# Patient Record
Sex: MALE | Race: WHITE | NOT HISPANIC OR LATINO | Employment: FULL TIME | URBAN - NONMETROPOLITAN AREA
[De-identification: names, ages, dates, MRNs, and addresses within clinical notes are randomized per-mention and may not be internally consistent; named-entity substitution may affect disease eponyms.]

---

## 2021-02-01 ENCOUNTER — OFFICE VISIT (OUTPATIENT)
Dept: URGENT CARE | Facility: PHYSICIAN GROUP | Age: 41
End: 2021-02-01
Payer: COMMERCIAL

## 2021-02-01 VITALS
BODY MASS INDEX: 29.47 KG/M2 | OXYGEN SATURATION: 94 % | RESPIRATION RATE: 16 BRPM | SYSTOLIC BLOOD PRESSURE: 114 MMHG | WEIGHT: 199 LBS | DIASTOLIC BLOOD PRESSURE: 72 MMHG | HEIGHT: 69 IN | HEART RATE: 75 BPM | TEMPERATURE: 97.1 F

## 2021-02-01 DIAGNOSIS — S39.012A ACUTE MYOFASCIAL STRAIN OF LUMBAR REGION, INITIAL ENCOUNTER: ICD-10-CM

## 2021-02-01 PROCEDURE — 99204 OFFICE O/P NEW MOD 45 MIN: CPT | Performed by: PHYSICIAN ASSISTANT

## 2021-02-01 RX ORDER — CYCLOBENZAPRINE HCL 10 MG
10 TABLET ORAL 3 TIMES DAILY PRN
Qty: 30 TAB | Refills: 0 | Status: SHIPPED | OUTPATIENT
Start: 2021-02-01 | End: 2021-09-15

## 2021-02-01 RX ORDER — PREDNISONE 10 MG/1
TABLET ORAL
Qty: 1 QUANTITY SUFFICIENT | Refills: 0 | Status: SHIPPED | OUTPATIENT
Start: 2021-02-01 | End: 2021-09-15

## 2021-02-01 NOTE — PROGRESS NOTES
"Chief Complaint   Patient presents with   • Back Pain     x1day, lower right achey px       HISTORY OF PRESENT ILLNESS: Patient is a 37 y.o. male who presents today for the following:    Right side LBP after picking up a case of water yesterday  Constant, achy  Ibuprofen didn't help  Denies distal paresthesias, radiating pain, saddle anesthesia, bowel/bladder incontinence    There are no active problems to display for this patient.      Allergies:Patient has no known allergies.    Current Outpatient Medications Ordered in Epic   Medication Sig Dispense Refill   • Buprenorphine HCl-Naloxone HCl (SUBOXONE SL) Place  under the tongue.     • predniSONE (DELTASONE) 10 MG Tab 50 mg x 1 day; 40 mg x 1 day; 30 mg x 1 day; 20 mg x 1 day; 10 mg x 1 day 1 Quantity Sufficient 0   • cyclobenzaprine (FLEXERIL) 10 mg Tab Take 1 Tab by mouth 3 times a day as needed for Mild Pain or Moderate Pain. 30 Tab 0     No current Epic-ordered facility-administered medications on file.        History reviewed. No pertinent past medical history.    Social History     Tobacco Use   • Smoking status: Not on file   Substance Use Topics   • Alcohol use: Not on file   • Drug use: Not on file       No family status information on file.   History reviewed. No pertinent family history.    Review of Systems:    Constitutional ROS: No unexpected change in weight, No weakness, No fatigue  Pulmonary ROS: No chronic cough, sputum, or hemoptysis, No dyspnea on exertion, No wheezing  Cardiovascular ROS: No diaphoresis, No edema, No palpitations  Musculoskeletal/Extremities ROS: Right-sided low back pain  Hematologic/Lymphatic ROS: No chills, No night sweats, No weight loss  Skin/Integumentary ROS: No edema, No evidence of rash, No itching      Exam:  /72   Pulse 75   Temp 36.2 °C (97.1 °F) (Temporal)   Resp 16   Ht 1.753 m (5' 9\")   Wt 90.3 kg (199 lb)   SpO2 94%   General: Well developed, well nourished. No distress.    HENT: Head is grossly " normal.  Pulmonary: Unlabored respiratory effort.   Neurologic: Grossly nonfocal. No facial asymmetry noted.  Musculoskeletal: Mild tenderness noted in the paraspinous muscles of the lumbar spine on the right side only.  No vertebral tenderness noted.  Decreased range of motion in all planes due to pain.  No motor or sensory deficit noted in the extremities.  Strong plantar flexion/dorsiflexion bilaterally.  Skin: Warm, dry, good turgor. No rashes in visible areas.   Psych: Normal mood. Alert and oriented to person, place and time.    Assessment/Plan:  Use all medication as directed.  Discussed appropriate over-the-counter symptomatic medication, and when to return to clinic.  Discussed red flags and ER precautions.  Follow up for worsening or persistent symptoms.  1. Acute myofascial strain of lumbar region, initial encounter  predniSONE (DELTASONE) 10 MG Tab    cyclobenzaprine (FLEXERIL) 10 mg Tab

## 2021-09-01 DIAGNOSIS — Z11.52 ENCOUNTER FOR SCREENING FOR COVID-19: ICD-10-CM

## 2021-09-08 ENCOUNTER — HOSPITAL ENCOUNTER (OUTPATIENT)
Facility: MEDICAL CENTER | Age: 41
End: 2021-09-08
Attending: PHYSICIAN ASSISTANT
Payer: COMMERCIAL

## 2021-09-08 DIAGNOSIS — Z11.52 ENCOUNTER FOR SCREENING FOR COVID-19: ICD-10-CM

## 2021-09-08 PROCEDURE — U0003 INFECTIOUS AGENT DETECTION BY NUCLEIC ACID (DNA OR RNA); SEVERE ACUTE RESPIRATORY SYNDROME CORONAVIRUS 2 (SARS-COV-2) (CORONAVIRUS DISEASE [COVID-19]), AMPLIFIED PROBE TECHNIQUE, MAKING USE OF HIGH THROUGHPUT TECHNOLOGIES AS DESCRIBED BY CMS-2020-01-R: HCPCS

## 2021-09-08 PROCEDURE — U0005 INFEC AGEN DETEC AMPLI PROBE: HCPCS

## 2021-09-09 LAB
COVID ORDER STATUS COVID19: NORMAL
SARS-COV-2 RNA RESP QL NAA+PROBE: DETECTED
SPECIMEN SOURCE: ABNORMAL

## 2021-09-15 ENCOUNTER — OFFICE VISIT (OUTPATIENT)
Dept: URGENT CARE | Facility: PHYSICIAN GROUP | Age: 41
End: 2021-09-15
Payer: COMMERCIAL

## 2021-09-15 VITALS
SYSTOLIC BLOOD PRESSURE: 116 MMHG | TEMPERATURE: 98.1 F | WEIGHT: 189 LBS | BODY MASS INDEX: 27.99 KG/M2 | OXYGEN SATURATION: 94 % | RESPIRATION RATE: 16 BRPM | HEIGHT: 69 IN | DIASTOLIC BLOOD PRESSURE: 64 MMHG | HEART RATE: 109 BPM

## 2021-09-15 DIAGNOSIS — U07.1 COVID-19 VIRUS INFECTION: ICD-10-CM

## 2021-09-15 DIAGNOSIS — R06.02 SOB (SHORTNESS OF BREATH): ICD-10-CM

## 2021-09-15 PROCEDURE — 99214 OFFICE O/P EST MOD 30 MIN: CPT | Mod: CS | Performed by: PHYSICIAN ASSISTANT

## 2021-09-15 RX ORDER — DEXAMETHASONE 6 MG/1
6 TABLET ORAL DAILY
Qty: 7 TABLET | Refills: 0 | Status: SHIPPED | OUTPATIENT
Start: 2021-09-15 | End: 2021-09-22

## 2021-09-15 RX ORDER — BUPRENORPHINE AND NALOXONE 8; 2 MG/1; MG/1
FILM, SOLUBLE BUCCAL; SUBLINGUAL
COMMUNITY
Start: 2021-09-05 | End: 2021-09-15

## 2021-09-15 NOTE — PROGRESS NOTES
"Chief Complaint   Patient presents with   • Coronavirus Screening     Positive: SOB 2-3days        HISTORY OF PRESENT ILLNESS: Patient is a 41 y.o. male who presents today for the following:    COVID positive; symptoms for around a week  SOB over the last 2-3 days  Taste is still off  Fatigued    There are no problems to display for this patient.      Allergies:Patient has no known allergies.    Current Outpatient Medications Ordered in Epic   Medication Sig Dispense Refill   • dexamethasone (DECADRON) 6 MG Tab Take 1 Tablet by mouth every day for 7 days. 7 Tablet 0   • Buprenorphine HCl-Naloxone HCl (SUBOXONE SL) Place  under the tongue.       No current Epic-ordered facility-administered medications on file.       History reviewed. No pertinent past medical history.    Social History     Tobacco Use   • Smoking status: Not on file   Substance Use Topics   • Alcohol use: Not on file   • Drug use: Not on file       No family status information on file.   History reviewed. No pertinent family history.    Review of Systems:   Pertinent systems reviewed with the patient.      Exam:  /64   Pulse (!) 109   Temp 36.7 °C (98.1 °F) (Temporal)   Resp 16   Ht 1.753 m (5' 9\")   Wt 85.7 kg (189 lb)   SpO2 94%   General: Well developed, well nourished. No distress.    HENT: Head is grossly normal.  Pulmonary: Unlabored respiratory effort. Lungs clear to auscultation, no wheezes, no rhonchi.    Cardiovascular: Regular rate and rhythm without murmur.   Neurologic: Grossly nonfocal. No facial asymmetry noted.  Lymph: No cervical lymphadenopathy noted.  Skin: Warm, dry, good turgor. No rashes in visible areas.   Psych: Normal mood. Alert and oriented to person, place and time.    Assessment/Plan:  93-94% on room air.  Starting steroids.  Follow up for worsening or persistent symptoms.  1. COVID-19 virus infection     2. SOB (shortness of breath)  dexamethasone (DECADRON) 6 MG Tab       "

## 2021-09-17 ENCOUNTER — TELEPHONE (OUTPATIENT)
Dept: URGENT CARE | Facility: PHYSICIAN GROUP | Age: 41
End: 2021-09-17

## 2022-02-11 ENCOUNTER — HOSPITAL ENCOUNTER (OUTPATIENT)
Dept: LAB | Facility: MEDICAL CENTER | Age: 42
End: 2022-02-11
Attending: FAMILY MEDICINE
Payer: COMMERCIAL

## 2022-02-11 ENCOUNTER — OFFICE VISIT (OUTPATIENT)
Dept: MEDICAL GROUP | Facility: PHYSICIAN GROUP | Age: 42
End: 2022-02-11
Payer: COMMERCIAL

## 2022-02-11 VITALS
OXYGEN SATURATION: 98 % | TEMPERATURE: 97.2 F | RESPIRATION RATE: 14 BRPM | WEIGHT: 207 LBS | SYSTOLIC BLOOD PRESSURE: 120 MMHG | HEIGHT: 69 IN | HEART RATE: 84 BPM | BODY MASS INDEX: 30.66 KG/M2 | DIASTOLIC BLOOD PRESSURE: 82 MMHG

## 2022-02-11 DIAGNOSIS — E66.9 OBESITY (BMI 30-39.9): ICD-10-CM

## 2022-02-11 DIAGNOSIS — R07.89 OTHER CHEST PAIN: ICD-10-CM

## 2022-02-11 DIAGNOSIS — L40.3 PUSTULAR PSORIASIS OF PALM OF HAND: ICD-10-CM

## 2022-02-11 DIAGNOSIS — F41.9 ANXIETY: ICD-10-CM

## 2022-02-11 DIAGNOSIS — R07.9 CHEST PAIN, UNSPECIFIED TYPE: ICD-10-CM

## 2022-02-11 DIAGNOSIS — L65.9 HAIR LOSS: ICD-10-CM

## 2022-02-11 LAB
ALBUMIN SERPL BCP-MCNC: 4.6 G/DL (ref 3.2–4.9)
ALBUMIN/GLOB SERPL: 1.7 G/DL
ALP SERPL-CCNC: 88 U/L (ref 30–99)
ALT SERPL-CCNC: 67 U/L (ref 2–50)
ANION GAP SERPL CALC-SCNC: 11 MMOL/L (ref 7–16)
AST SERPL-CCNC: 40 U/L (ref 12–45)
BILIRUB SERPL-MCNC: 0.7 MG/DL (ref 0.1–1.5)
BUN SERPL-MCNC: 18 MG/DL (ref 8–22)
CALCIUM SERPL-MCNC: 9.5 MG/DL (ref 8.5–10.5)
CHLORIDE SERPL-SCNC: 104 MMOL/L (ref 96–112)
CHOLEST SERPL-MCNC: 172 MG/DL (ref 100–199)
CO2 SERPL-SCNC: 24 MMOL/L (ref 20–33)
CREAT SERPL-MCNC: 0.79 MG/DL (ref 0.5–1.4)
FASTING STATUS PATIENT QL REPORTED: NORMAL
GLOBULIN SER CALC-MCNC: 2.7 G/DL (ref 1.9–3.5)
GLUCOSE SERPL-MCNC: 93 MG/DL (ref 65–99)
HDLC SERPL-MCNC: 42 MG/DL
LDLC SERPL CALC-MCNC: 115 MG/DL
POTASSIUM SERPL-SCNC: 4.2 MMOL/L (ref 3.6–5.5)
PROT SERPL-MCNC: 7.3 G/DL (ref 6–8.2)
SODIUM SERPL-SCNC: 139 MMOL/L (ref 135–145)
TRIGL SERPL-MCNC: 77 MG/DL (ref 0–149)
TSH SERPL DL<=0.005 MIU/L-ACNC: 1.46 UIU/ML (ref 0.38–5.33)

## 2022-02-11 PROCEDURE — 85025 COMPLETE CBC W/AUTO DIFF WBC: CPT

## 2022-02-11 PROCEDURE — 80061 LIPID PANEL: CPT

## 2022-02-11 PROCEDURE — 84443 ASSAY THYROID STIM HORMONE: CPT

## 2022-02-11 PROCEDURE — 99214 OFFICE O/P EST MOD 30 MIN: CPT | Performed by: FAMILY MEDICINE

## 2022-02-11 PROCEDURE — 36415 COLL VENOUS BLD VENIPUNCTURE: CPT

## 2022-02-11 PROCEDURE — 80053 COMPREHEN METABOLIC PANEL: CPT

## 2022-02-11 RX ORDER — BUPRENORPHINE AND NALOXONE 8; 2 MG/1; MG/1
FILM, SOLUBLE BUCCAL; SUBLINGUAL
COMMUNITY
Start: 2022-01-31 | End: 2022-12-15 | Stop reason: SDUPTHER

## 2022-02-11 RX ORDER — CITALOPRAM 20 MG/1
20 TABLET ORAL DAILY
Qty: 30 TABLET | Refills: 3 | Status: SHIPPED | OUTPATIENT
Start: 2022-02-11 | End: 2022-06-28 | Stop reason: SDUPTHER

## 2022-02-11 RX ORDER — TRIAMCINOLONE ACETONIDE 1 MG/G
CREAM TOPICAL
Qty: 453.6 G | Refills: 1 | Status: SHIPPED | OUTPATIENT
Start: 2022-02-11 | End: 2022-09-01

## 2022-02-11 RX ORDER — HYDROXYZINE HYDROCHLORIDE 25 MG/1
25 TABLET, FILM COATED ORAL 3 TIMES DAILY PRN
Qty: 30 TABLET | Refills: 3 | Status: SHIPPED | OUTPATIENT
Start: 2022-02-11 | End: 2022-10-21 | Stop reason: SDUPTHER

## 2022-02-11 ASSESSMENT — PATIENT HEALTH QUESTIONNAIRE - PHQ9
SUM OF ALL RESPONSES TO PHQ QUESTIONS 1-9: 5
5. POOR APPETITE OR OVEREATING: 0 - NOT AT ALL
CLINICAL INTERPRETATION OF PHQ2 SCORE: 1

## 2022-02-11 NOTE — ASSESSMENT & PLAN NOTE
Has dry skin, fissures, pustules, worse today due to golf recently.   rx for triamcinolone cream provided.

## 2022-02-11 NOTE — ASSESSMENT & PLAN NOTE
He has noticed an increase in hair loss especially in the shower. Appears like clumps of hair are falling out.

## 2022-02-11 NOTE — ASSESSMENT & PLAN NOTE
Patient is 41 years old.  Over the past 3 to 4 months he has noticed episodes that are intermittent of racing heart and chest pain.  These episodes happen at rest.  The first episode happened after work when he was home and laying in bed.  Episodes feel like heart racing chest pressure sometimes radiating to jaw and arm but he is not entirely sure about this radiation.  The episodes last from 5 to 30 minutes to 1 hour episodes happen every 2 weeks.  Episodes resolve when he gets up and walks around calms down or falls asleep.  Some episodes have woken him from sleep or happen when he wakes up from a nap.   Last episode was 2 weeks ago.     Sometimes he will be laying in bed or sitting and will feel as though his body and head are shaking, he just has the feeling, his body is not actually shaking.     He does not drink coffee or tea on a daily basis.  He has 1 red bull a day only on workdays.  He quit smoking 5 months ago he has a 20-pack-year of smoking.  He has no current drug use he has a remote history of short-term use of meth and cocaine this is 20 years ago.  He does not drink alcohol on a regular basis except at social occasions.    He does not have a strong family history of heart disease.  No one in his family has had a heart attack, stroke, hypertension or dyslipidemia.  There are no relatives in his family with early death from heart attack.

## 2022-02-11 NOTE — ASSESSMENT & PLAN NOTE
He notes he has always been a high strung person.   He has never been diagnosed with anxiety or panic attacks in the past or in childhood.   In the past 3-4 months he sudden episodes that sometimes wake him from sleep of heart racing and chest pain. He sometimes feel impending doom or like he will die during the attacks.   His mother takes celexa for depression and it works well for her.   He tried prozac in the past which did not help.   Will provide Rx for celexa daily and hydroxyzine for as needed

## 2022-02-11 NOTE — PROGRESS NOTES
Subjective:   Harry Dahl is a 41 y.o. male here today for evaluation and management of:     Hair loss            Anxiety  He notes he has always been a high strung person.   He has never been diagnosed with anxiety or panic attacks in the past or in childhood.   In the past 3-4 months he sudden episodes that sometimes wake him from sleep of heart racing and chest pain. He sometimes feel impending doom or like he will die during the attacks.   His mother takes celexa for depression and it works well for her.   He tried prozac in the past which did not help.   Will provide Rx for celexa daily and hydroxyzine for as needed    Other chest pain  Patient is 41 years old.  Over the past 3 to 4 months he has noticed episodes that are intermittent of racing heart and chest pain.  These episodes happen at rest.  The first episode happened after work when he was home and laying in bed.  Episodes feel like heart racing chest pressure sometimes radiating to jaw and arm but he is not entirely sure about this radiation.  The episodes last from 5 to 30 minutes to 1 hour episodes happen every 2 weeks.  Episodes resolve when he gets up and walks around calms down or falls asleep.  Some episodes have woken him from sleep or happen when he wakes up from a nap.   Last episode was 2 weeks ago.     Sometimes he will be laying in bed or sitting and will feel as though his body and head are shaking, he just has the feeling, his body is not actually shaking.     He does not drink coffee or tea on a daily basis.  He has 1 red bull a day only on workdays.  He quit smoking 5 months ago he has a 20-pack-year of smoking.  He has no current drug use he has a remote history of short-term use of meth and cocaine this is 20 years ago.  He does not drink alcohol on a regular basis except at social occasions.    He does not have a strong family history of heart disease.  No one in his family has had a heart attack, stroke, hypertension or  "dyslipidemia.  There are no relatives in his family with early death from heart attack.                Pustular psoriasis of palm of hand  Has dry skin, fissures, pustules, worse today due to golf recently.   rx for triamcinolone cream provided.          Current medicines (including changes today)  Current Outpatient Medications   Medication Sig Dispense Refill   • Buprenorphine HCl-Naloxone HCl 8-2 MG FILM DISSOLVE 1 FILM UNDER THE TONGUE TWICE DAILY     • citalopram (CELEXA) 20 MG Tab Take 1 Tablet by mouth every day. For anxiety 30 Tablet 3   • hydrOXYzine HCl (ATARAX) 25 MG Tab Take 1 Tablet by mouth 3 times a day as needed for Anxiety. 30 Tablet 3   • triamcinolone acetonide (KENALOG) 0.1 % Cream Use in a thin film to affected skin twice a day. Stop for a week if using longer than 3 weeks. 453.6 g 1     No current facility-administered medications for this visit.     He  has no past medical history on file.    ROS  No chest pain, no shortness of breath, no abdominal pain       Objective:     /82   Pulse 84   Temp 36.2 °C (97.2 °F) (Temporal)   Resp 14   Ht 1.753 m (5' 9\")   Wt 93.9 kg (207 lb)   SpO2 98%  Body mass index is 30.57 kg/m².   Physical Exam:  Constitutional: Alert, no distress.  Skin: Warm, dry, good turgor, no rashes in visible areas.  Eye: Equal, round and reactive, conjunctiva clear, lids normal.  ENMT: Lips without lesions, good dentition, oropharynx clear.  Neck: Trachea midline, no masses, no thyromegaly. No cervical or supraclavicular lymphadenopathy  Respiratory: Unlabored respiratory effort, lungs clear to auscultation, no wheezes, no ronchi.  Cardiovascular: Normal S1, S2, no murmur, no edema.  Abdomen: Soft, non-tender, no masses, no hepatosplenomegaly.  Psych: Alert and oriented x3, normal affect and mood.        Assessment and Plan:   The following treatment plan was discussed    1. Chest pain, unspecified type    - EKG - Clinic Performed  - CBC WITH DIFFERENTIAL; Future  - " Comp Metabolic Panel; Future  - Lipid Profile; Future  - TSH WITH REFLEX TO FT4; Future    2. Hair loss      3. Other chest pain      4. Anxiety    5. Obesity (BMI 30-39.9)    - Patient identified as having weight management issue.  Appropriate orders and counseling given.      Followup: Return in about 3 months (around 5/11/2022) for chest pain, anxiety. .

## 2022-02-12 LAB
BASOPHILS # BLD AUTO: 0.5 % (ref 0–1.8)
BASOPHILS # BLD: 0.03 K/UL (ref 0–0.12)
EOSINOPHIL # BLD AUTO: 0.1 K/UL (ref 0–0.51)
EOSINOPHIL NFR BLD: 1.6 % (ref 0–6.9)
ERYTHROCYTE [DISTWIDTH] IN BLOOD BY AUTOMATED COUNT: 40.5 FL (ref 35.9–50)
HCT VFR BLD AUTO: 43.7 % (ref 42–52)
HGB BLD-MCNC: 15.3 G/DL (ref 14–18)
IMM GRANULOCYTES # BLD AUTO: 0.02 K/UL (ref 0–0.11)
IMM GRANULOCYTES NFR BLD AUTO: 0.3 % (ref 0–0.9)
LYMPHOCYTES # BLD AUTO: 1.92 K/UL (ref 1–4.8)
LYMPHOCYTES NFR BLD: 31.1 % (ref 22–41)
MCH RBC QN AUTO: 29.5 PG (ref 27–33)
MCHC RBC AUTO-ENTMCNC: 35 G/DL (ref 33.7–35.3)
MCV RBC AUTO: 84.4 FL (ref 81.4–97.8)
MONOCYTES # BLD AUTO: 0.76 K/UL (ref 0–0.85)
MONOCYTES NFR BLD AUTO: 12.3 % (ref 0–13.4)
NEUTROPHILS # BLD AUTO: 3.34 K/UL (ref 1.82–7.42)
NEUTROPHILS NFR BLD: 54.2 % (ref 44–72)
NRBC # BLD AUTO: 0 K/UL
NRBC BLD-RTO: 0 /100 WBC
PLATELET # BLD AUTO: 212 K/UL (ref 164–446)
PMV BLD AUTO: 11.3 FL (ref 9–12.9)
RBC # BLD AUTO: 5.18 M/UL (ref 4.7–6.1)
WBC # BLD AUTO: 6.2 K/UL (ref 4.8–10.8)

## 2022-02-23 NOTE — RESULT ENCOUNTER NOTE
Released to daphne Mcdonald,  Your labs show slightly elevated cholesterol and liver enzymes.  You can improve this with decreasing fried fatty foods and avoid alcohol intake.  I will follow up with you in May to discuss these in detail.  Danny Ryan M.D.

## 2022-06-28 RX ORDER — CITALOPRAM 20 MG/1
20 TABLET ORAL DAILY
Qty: 90 TABLET | Refills: 3 | Status: SHIPPED | OUTPATIENT
Start: 2022-06-28 | End: 2022-09-01 | Stop reason: SDUPTHER

## 2022-06-28 NOTE — TELEPHONE ENCOUNTER
Received request via: Pharmacy    Was the patient seen in the last year in this department? Yes    Does the patient have an active prescription (recently filled or refills available) for medication(s) requested? No     Last office Visit:02/11/2022  Last Labs:02/11/2022

## 2022-09-01 ENCOUNTER — OFFICE VISIT (OUTPATIENT)
Dept: MEDICAL GROUP | Facility: PHYSICIAN GROUP | Age: 42
End: 2022-09-01
Payer: COMMERCIAL

## 2022-09-01 VITALS
SYSTOLIC BLOOD PRESSURE: 120 MMHG | DIASTOLIC BLOOD PRESSURE: 82 MMHG | OXYGEN SATURATION: 96 % | BODY MASS INDEX: 30.51 KG/M2 | TEMPERATURE: 97.4 F | RESPIRATION RATE: 18 BRPM | HEART RATE: 95 BPM | HEIGHT: 69 IN | WEIGHT: 206 LBS

## 2022-09-01 DIAGNOSIS — Z23 NEED FOR VACCINATION: ICD-10-CM

## 2022-09-01 DIAGNOSIS — E78.00 ELEVATED LDL CHOLESTEROL LEVEL: ICD-10-CM

## 2022-09-01 DIAGNOSIS — R68.82 LOW LIBIDO: ICD-10-CM

## 2022-09-01 DIAGNOSIS — F41.9 ANXIETY: ICD-10-CM

## 2022-09-01 DIAGNOSIS — R79.89 LFT ELEVATION: ICD-10-CM

## 2022-09-01 DIAGNOSIS — E66.9 OBESITY (BMI 30-39.9): ICD-10-CM

## 2022-09-01 PROCEDURE — 90746 HEPB VACCINE 3 DOSE ADULT IM: CPT | Performed by: FAMILY MEDICINE

## 2022-09-01 PROCEDURE — 90471 IMMUNIZATION ADMIN: CPT | Performed by: FAMILY MEDICINE

## 2022-09-01 PROCEDURE — 99214 OFFICE O/P EST MOD 30 MIN: CPT | Mod: 25 | Performed by: FAMILY MEDICINE

## 2022-09-01 RX ORDER — CITALOPRAM 20 MG/1
20 TABLET ORAL DAILY
Qty: 90 TABLET | Refills: 3 | Status: SHIPPED | OUTPATIENT
Start: 2022-09-01 | End: 2023-10-11 | Stop reason: SDUPTHER

## 2022-09-01 ASSESSMENT — FIBROSIS 4 INDEX: FIB4 SCORE: 0.95

## 2022-09-01 NOTE — PATIENT INSTRUCTIONS
Please get fasting labs, fasting for 8-10 hours before next visit. You can make an appointment for the lab or walk in.   Lab hours [facility] location: Monday to Friday 6 am - 4 pm, Saturday 7 am -noon  Even if you lose your lab paperwork, you can still come in to get your lab done.

## 2022-09-01 NOTE — ASSESSMENT & PLAN NOTE
Took celexa only a few times would llike rx for this.   rx for celexa 20 mg provided.   He has some relief of anxiety with hydroxyzine as needed.

## 2022-09-01 NOTE — ASSESSMENT & PLAN NOTE
Decreased libido lately  He has increased stress at work.   Has 5 days off, 5 on and rotates nights and days.   He has increased fatigue, irritability, feels he could sleep for ever even if during the day.

## 2022-09-02 ENCOUNTER — HOSPITAL ENCOUNTER (OUTPATIENT)
Dept: LAB | Facility: MEDICAL CENTER | Age: 42
End: 2022-09-02
Attending: FAMILY MEDICINE
Payer: COMMERCIAL

## 2022-09-02 DIAGNOSIS — R68.82 LOW LIBIDO: ICD-10-CM

## 2022-09-02 DIAGNOSIS — R79.89 LFT ELEVATION: ICD-10-CM

## 2022-09-02 LAB
ALBUMIN SERPL BCP-MCNC: 4.1 G/DL (ref 3.2–4.9)
ALP SERPL-CCNC: 90 U/L (ref 30–99)
ALT SERPL-CCNC: 68 U/L (ref 2–50)
AST SERPL-CCNC: 34 U/L (ref 12–45)
BILIRUB CONJ SERPL-MCNC: <0.2 MG/DL (ref 0.1–0.5)
BILIRUB INDIRECT SERPL-MCNC: ABNORMAL MG/DL (ref 0–1)
BILIRUB SERPL-MCNC: 0.5 MG/DL (ref 0.1–1.5)
PROT SERPL-MCNC: 6.9 G/DL (ref 6–8.2)
TESTOST SERPL-MCNC: 225 NG/DL (ref 175–781)

## 2022-09-02 PROCEDURE — 80076 HEPATIC FUNCTION PANEL: CPT

## 2022-09-02 PROCEDURE — 84403 ASSAY OF TOTAL TESTOSTERONE: CPT

## 2022-09-02 PROCEDURE — 36415 COLL VENOUS BLD VENIPUNCTURE: CPT

## 2022-09-04 ENCOUNTER — HOSPITAL ENCOUNTER (OUTPATIENT)
Facility: MEDICAL CENTER | Age: 42
End: 2022-09-04
Attending: FAMILY MEDICINE
Payer: COMMERCIAL

## 2022-09-04 ENCOUNTER — OFFICE VISIT (OUTPATIENT)
Dept: URGENT CARE | Facility: PHYSICIAN GROUP | Age: 42
End: 2022-09-04
Payer: COMMERCIAL

## 2022-09-04 VITALS
DIASTOLIC BLOOD PRESSURE: 76 MMHG | HEART RATE: 94 BPM | TEMPERATURE: 97.7 F | BODY MASS INDEX: 30.36 KG/M2 | SYSTOLIC BLOOD PRESSURE: 126 MMHG | WEIGHT: 205 LBS | HEIGHT: 69 IN | RESPIRATION RATE: 16 BRPM | OXYGEN SATURATION: 97 %

## 2022-09-04 DIAGNOSIS — L01.00 IMPETIGO: ICD-10-CM

## 2022-09-04 PROCEDURE — 87075 CULTR BACTERIA EXCEPT BLOOD: CPT

## 2022-09-04 PROCEDURE — 87205 SMEAR GRAM STAIN: CPT

## 2022-09-04 PROCEDURE — 99213 OFFICE O/P EST LOW 20 MIN: CPT | Performed by: FAMILY MEDICINE

## 2022-09-04 PROCEDURE — 87070 CULTURE OTHR SPECIMN AEROBIC: CPT

## 2022-09-04 RX ORDER — SULFAMETHOXAZOLE AND TRIMETHOPRIM 800; 160 MG/1; MG/1
1 TABLET ORAL 2 TIMES DAILY
Qty: 14 TABLET | Refills: 0 | Status: SHIPPED | OUTPATIENT
Start: 2022-09-04 | End: 2022-09-10

## 2022-09-04 ASSESSMENT — FIBROSIS 4 INDEX: FIB4 SCORE: 0.8

## 2022-09-04 NOTE — PROGRESS NOTES
Subjective:      Chief Complaint   Patient presents with    Rash     Started on elbows and knees in July, moving all over body, now on neck and chin x 1 wk .            Rash  This is a new problem. The current episode started in the past 7 days. The problem is unchanged. Pain location:    Left knee, neck, chin          The rash is characterized by redness and pain. Pt was exposed to nothing. Pertinent negatives include no cough, diarrhea or fever. Past treatments include: none. There is no history of allergies.     Social History     Tobacco Use    Smoking status: Former    Smokeless tobacco: Current    Tobacco comments:     Uses patches   Vaping Use    Vaping Use: Never used   Substance Use Topics    Alcohol use: Yes    Drug use: Never       Current Outpatient Medications on File Prior to Visit   Medication Sig Dispense Refill    citalopram (CELEXA) 20 MG Tab Take 1 Tablet by mouth every day. For anxiety 90 Tablet 3    Buprenorphine HCl-Naloxone HCl 8-2 MG FILM DISSOLVE 1 FILM UNDER THE TONGUE TWICE DAILY      hydrOXYzine HCl (ATARAX) 25 MG Tab Take 1 Tablet by mouth 3 times a day as needed for Anxiety. 30 Tablet 3     No current facility-administered medications on file prior to visit.       No past medical history on file.             Review of Systems   Constitutional: Negative for fever, chills and weight loss.   HENT - denies cough, ear pain, congestion, sore throat  Eyes: denies vision changes  Respiratory: Negative for cough and wheezing.    Cardiovascular: Negative for chest pain.   Gastrointestinal:  No abdominal pain,  nausea, vomiting, diarrhea.  Negative for  blood in stool.    - no discharge, dysuria, frequency.      Neurological: Negative for dizziness and headaches.   musculoskeletal - denies myalgias, calf pain  Psych - denies anxiety/depression/mood changes.  Skin: +  Rash, itching  All other systems reviewed and are negative.              Objective:     /76   Pulse 94   Temp 36.5 °C  "(97.7 °F) (Temporal)   Resp 16   Ht 1.753 m (5' 9\")   Wt 93 kg (205 lb)   SpO2 97%     Physical Exam   Constitutional: pt is oriented to person, place, and time. Pt appears well-developed. No distress.   HENT:   Head: Normocephalic and atraumatic.   Eyes: Conjunctivae are normal.   Cardiovascular: Normal rate, regular rhythm and normal heart sounds.    Pulmonary/Chest: Effort normal and breath sounds normal. No respiratory distress.   Neurological: pt is alert and oriented to person, place, and time. No cranial nerve deficit.   Skin: Skin is warm. Rash (  multiple small honey crusted papules left knee, neck, and chin ) noted. Pt is not diaphoretic. There is erythema.   Psychiatric:  behavior is normal.   Nursing note and vitals reviewed.              Assessment/Plan:     1. Impetigo     - sulfamethoxazole-trimethoprim (BACTRIM DS) 800-160 MG tablet; Take 1 Tablet by mouth 2 times a day for 7 days.  Dispense: 14 Tablet; Refill: 0  - mupirocin (BACTROBAN) 2 % Ointment; Apply 1 Application topically 2 times a day.  Dispense: 15 g; Refill: 0     - ANAEROBIC/AEROBIC/GRAM STAIN      Follow up in one week if no improvement, sooner if symptoms worsen.     "

## 2022-09-06 DIAGNOSIS — L01.00 IMPETIGO: ICD-10-CM

## 2022-09-06 LAB
GRAM STN SPEC: NORMAL
SIGNIFICANT IND 70042: NORMAL
SITE SITE: NORMAL
SOURCE SOURCE: NORMAL

## 2022-09-07 NOTE — PROGRESS NOTES
"Subjective:   Harry Dahl is a 42 y.o. male here today for evaluation and management of:     Low libido  Decreased libido lately  He has increased stress at work.   Has 5 days off, 5 on and rotates nights and days.   He has increased fatigue, irritability, feels he could sleep for ever even if during the day.       Anxiety  Took celexa only a few times would llike rx for this.   rx for celexa 20 mg provided.   He has some relief of anxiety with hydroxyzine as needed.     Obesity (BMI 30-39.9)  Encouraged healthy diet changes with plenty of vegetable, 8 glasses of water a day       Current medicines (including changes today)  Current Outpatient Medications   Medication Sig Dispense Refill    citalopram (CELEXA) 20 MG Tab Take 1 Tablet by mouth every day. For anxiety 90 Tablet 3    Buprenorphine HCl-Naloxone HCl 8-2 MG FILM DISSOLVE 1 FILM UNDER THE TONGUE TWICE DAILY      hydrOXYzine HCl (ATARAX) 25 MG Tab Take 1 Tablet by mouth 3 times a day as needed for Anxiety. 30 Tablet 3    mupirocin (BACTROBAN) 2 % Ointment Apply 1 Application topically 3 times a day. 30 g 2    sulfamethoxazole-trimethoprim (BACTRIM DS) 800-160 MG tablet Take 1 Tablet by mouth 2 times a day for 7 days. 14 Tablet 0     No current facility-administered medications for this visit.     He  has no past medical history on file.    ROS  No chest pain, no shortness of breath, no abdominal pain       Objective:     /82 (BP Location: Left arm)   Pulse 95   Temp 36.3 °C (97.4 °F) (Temporal)   Resp 18   Ht 1.753 m (5' 9\")   Wt 93.4 kg (206 lb)   SpO2 96%  Body mass index is 30.42 kg/m².   Physical Exam:  Constitutional: Alert, no distress.  Skin: Warm, dry, good turgor, no rashes in visible areas.  Eye: Equal, round and reactive, conjunctiva clear, lids normal.  ENMT: Lips without lesions, good dentition, oropharynx clear.  Neck: Trachea midline, no masses, no thyromegaly. No cervical or supraclavicular lymphadenopathy  Respiratory: " Unlabored respiratory effort, lungs clear to auscultation, no wheezes, no ronchi.  Cardiovascular: Normal S1, S2, no murmur, no edema.  Abdomen: Soft, non-tender, no masses, no hepatosplenomegaly.  Psych: Alert and oriented x3, normal affect and mood.        Assessment and Plan:   The following treatment plan was discussed    1. Low libido  - TESTOSTERONE SERUM; Future    2. Anxiety    3. Obesity (BMI 30-39.9)    4. Elevated LDL cholesterol level    5. LFT elevation  - HEPATIC FUNCTION PANEL; Future    6. Need for vaccination  - Hepatitis B Vaccine Adult 20+    Other orders  - citalopram (CELEXA) 20 MG Tab; Take 1 Tablet by mouth every day. For anxiety  Dispense: 90 Tablet; Refill: 3      Followup: Return in about 3 months (around 12/1/2022) for Lab Review.

## 2022-09-09 LAB
BACTERIA WND AEROBE CULT: NORMAL
GRAM STN SPEC: NORMAL
SIGNIFICANT IND 70042: NORMAL
SITE SITE: NORMAL
SOURCE SOURCE: NORMAL

## 2022-09-10 ENCOUNTER — OFFICE VISIT (OUTPATIENT)
Dept: URGENT CARE | Facility: PHYSICIAN GROUP | Age: 42
End: 2022-09-10
Payer: COMMERCIAL

## 2022-09-10 VITALS
RESPIRATION RATE: 16 BRPM | HEIGHT: 69 IN | WEIGHT: 205 LBS | OXYGEN SATURATION: 96 % | TEMPERATURE: 98.1 F | DIASTOLIC BLOOD PRESSURE: 84 MMHG | SYSTOLIC BLOOD PRESSURE: 144 MMHG | BODY MASS INDEX: 30.36 KG/M2 | HEART RATE: 84 BPM

## 2022-09-10 DIAGNOSIS — B35.9 TINEA: ICD-10-CM

## 2022-09-10 DIAGNOSIS — L08.9 LOCAL SKIN INFECTION: ICD-10-CM

## 2022-09-10 LAB
BACTERIA SPEC ANAEROBE CULT: NORMAL
SIGNIFICANT IND 70042: NORMAL
SITE SITE: NORMAL
SOURCE SOURCE: NORMAL

## 2022-09-10 PROCEDURE — 99214 OFFICE O/P EST MOD 30 MIN: CPT | Performed by: NURSE PRACTITIONER

## 2022-09-10 RX ORDER — SULFAMETHOXAZOLE AND TRIMETHOPRIM 800; 160 MG/1; MG/1
1 TABLET ORAL 2 TIMES DAILY
Qty: 6 TABLET | Refills: 0 | Status: SHIPPED | OUTPATIENT
Start: 2022-09-10 | End: 2022-09-13

## 2022-09-10 RX ORDER — KETOCONAZOLE 20 MG/G
1 CREAM TOPICAL DAILY
Qty: 60 G | Refills: 0 | Status: SHIPPED | OUTPATIENT
Start: 2022-09-10 | End: 2022-12-15

## 2022-09-10 ASSESSMENT — FIBROSIS 4 INDEX: FIB4 SCORE: 0.8

## 2022-09-10 NOTE — PROGRESS NOTES
Chief Complaint   Patient presents with    Rash     On chin, neck, pelvic, knee, elbows        HISTORY OF PRESENT ILLNESS: Patient is a pleasant 42 y.o. male who presents to urgent care today with complaints of a rash.  His rash started over 1 week ago.  The rash is pruritic, draining, and crusting.  Is located to neck, suprapubic region, bilateral elbows and knees.  He otherwise feels well denies any fever, chills, malaise.  He was seen last week for the same.  At that time he was placed on both Bactroban and Bactrim for 7 days.  Denies previous history of the same.  Denies others with similar rash.    Patient Active Problem List    Diagnosis Date Noted    Low libido 09/01/2022    Hair loss 02/11/2022    Other chest pain 02/11/2022    Anxiety 02/11/2022    Obesity (BMI 30-39.9) 02/11/2022    Pustular psoriasis of palm of hand 02/11/2022       Allergies:Patient has no known allergies.    Current Outpatient Medications Ordered in Epic   Medication Sig Dispense Refill    ketoconazole (NIZORAL) 2 % Cream Apply 1 Application topically every day. 60 g 0    sulfamethoxazole-trimethoprim (BACTRIM DS) 800-160 MG tablet Take 1 Tablet by mouth 2 times a day for 3 days. 6 Tablet 0    mupirocin (BACTROBAN) 2 % Ointment Apply 1 Application topically 3 times a day. 30 g 2    citalopram (CELEXA) 20 MG Tab Take 1 Tablet by mouth every day. For anxiety 90 Tablet 3    Buprenorphine HCl-Naloxone HCl 8-2 MG FILM DISSOLVE 1 FILM UNDER THE TONGUE TWICE DAILY      hydrOXYzine HCl (ATARAX) 25 MG Tab Take 1 Tablet by mouth 3 times a day as needed for Anxiety. 30 Tablet 3     No current Epic-ordered facility-administered medications on file.       History reviewed. No pertinent past medical history.    Social History     Tobacco Use    Smoking status: Former    Smokeless tobacco: Current    Tobacco comments:     Uses patches   Vaping Use    Vaping Use: Never used   Substance Use Topics    Alcohol use: Yes    Drug use: Never       No family  "status information on file.   History reviewed. No pertinent family history.    ROS:  Review of Systems   Constitutional: Negative for fever, chills, weight loss, malaise, and fatigue.   HENT: Negative for ear pain, nosebleeds, congestion, sore throat and neck pain.    Eyes: Negative for vision changes.   Neuro: Negative for headache, sensory changes, weakness, seizure, LOC.   Cardiovascular: Negative for chest pain, palpitations, orthopnea and leg swelling.   Respiratory: Negative for cough, sputum production, shortness of breath and wheezing.   Gastrointestinal: Negative for abdominal pain, nausea, vomiting or diarrhea.   Genitourinary: Negative for dysuria, urgency and frequency.  Musculoskeletal: Negative for falls, neck pain, back pain, joint pain, myalgias.   Skin: Positive for rash.   Negative for diaphoresis.     Exam:  BP (!) 144/84   Pulse 84   Temp 36.7 °C (98.1 °F) (Temporal)   Resp 16   Ht 1.753 m (5' 9\")   Wt 93 kg (205 lb)   SpO2 96%   General: well-nourished, well-developed male in NAD  Head: normocephalic, atraumatic  Eyes: PERRLA, no conjunctival injection, acuity grossly intact, lids normal.  Ears: normal shape and symmetry, no tenderness, no discharge. External canals are without any significant edema or erythema. Tympanic membranes are without any inflammation, no effusion. Gross auditory acuity is intact.  Nose: symmetrical without tenderness, no discharge.  Mouth/Throat: reasonable hygiene, no erythema, exudates or tonsillar enlargement.  Neck: no masses, range of motion within normal limits, no tracheal deviation. No obvious thyroid enlargement.   Lymph: no cervical adenopathy. No supraclavicular adenopathy.   Neuro: alert and oriented. Cranial nerves 1-12 grossly intact. No sensory deficit.   Cardiovascular: regular rate and rhythm. No edema.  Pulmonary: no distress. Chest is symmetrical with respiration, no wheezes, crackles, or rhonchi.   Musculoskeletal: no clubbing, appropriate " muscle tone, gait is stable.  Skin: warm, dry, intact, no clubbing, no cyanosis. Oval, erythematous, scaling plaque-like rash which spread centrifugally to anterior neck.  There are other scattered erythematous scabbed raised lesions noted to neck, suprapubic region, elbows and knees, honey colored crusting present.  Psych: appropriate mood, affect, judgement.         Assessment/Plan:  1. Tinea  ketoconazole (NIZORAL) 2 % Cream      2. Local skin infection  sulfamethoxazole-trimethoprim (BACTRIM DS) 800-160 MG tablet          Patient presents with ongoing rash.  There does seem to be somewhat of a fungal appearance, suspect secondary bacterial process as well.  We will continue Bactrim for 3 more days.  Discontinue Bactroban, ketoconazole instead.  Supportive care, differential diagnoses, and indications for immediate follow-up discussed with patient.   Pathogenesis of diagnosis discussed including typical length and natural progression.   Instructed to return to clinic or nearest emergency department for any change in condition, further concerns, or worsening of symptoms.  Patient states understanding of the plan of care and discharge instructions.  Instructed to make an appointment, for follow up, with his primary care provider.        Please note that this dictation was created using voice recognition software. I have made every reasonable attempt to correct obvious errors, but I expect that there are errors of grammar and possibly content that I did not discover before finalizing the note. Previous clinic visit encounter reviewed and considered in medical decision making today.         SHIRA Kuhn.

## 2022-09-15 ENCOUNTER — OFFICE VISIT (OUTPATIENT)
Dept: URGENT CARE | Facility: PHYSICIAN GROUP | Age: 42
End: 2022-09-15
Payer: COMMERCIAL

## 2022-09-15 VITALS
SYSTOLIC BLOOD PRESSURE: 133 MMHG | RESPIRATION RATE: 14 BRPM | HEART RATE: 84 BPM | HEIGHT: 68 IN | OXYGEN SATURATION: 98 % | BODY MASS INDEX: 31.07 KG/M2 | TEMPERATURE: 97.9 F | WEIGHT: 205 LBS | DIASTOLIC BLOOD PRESSURE: 94 MMHG

## 2022-09-15 DIAGNOSIS — L40.9 GENERALIZED PSORIASIS: ICD-10-CM

## 2022-09-15 PROCEDURE — 99214 OFFICE O/P EST MOD 30 MIN: CPT | Performed by: PHYSICIAN ASSISTANT

## 2022-09-15 RX ORDER — TRIAMCINOLONE ACETONIDE 1 MG/G
1 CREAM TOPICAL 2 TIMES DAILY
Qty: 80 G | Refills: 1 | Status: SHIPPED | OUTPATIENT
Start: 2022-09-15 | End: 2022-09-22

## 2022-09-15 RX ORDER — METHYLPREDNISOLONE 4 MG/1
TABLET ORAL
Qty: 21 TABLET | Refills: 0 | Status: SHIPPED | OUTPATIENT
Start: 2022-09-15 | End: 2022-12-15

## 2022-09-15 RX ORDER — HYDROXYZINE HYDROCHLORIDE 25 MG/1
25 TABLET, FILM COATED ORAL 3 TIMES DAILY PRN
Qty: 30 TABLET | Refills: 0 | Status: SHIPPED | OUTPATIENT
Start: 2022-09-15

## 2022-09-15 ASSESSMENT — ENCOUNTER SYMPTOMS
NAUSEA: 0
ABDOMINAL PAIN: 0
VOMITING: 0
DIZZINESS: 0
FEVER: 0
HEADACHES: 0
PALPITATIONS: 0
MYALGIAS: 0
CHILLS: 0
SHORTNESS OF BREATH: 0

## 2022-09-15 ASSESSMENT — FIBROSIS 4 INDEX: FIB4 SCORE: 0.8

## 2022-09-15 NOTE — PROGRESS NOTES
Subjective:     CHIEF COMPLAINT  Chief Complaint   Patient presents with    Follow-Up     Cream making rash worse       HPI  Harry Dahl is a very pleasant 42 y.o. male who presents to the clinic with a persistent red, scaling and pruritic rash that has gradually spread over the last month.  Rash is predominantly over the extensor surfaces of the knees and elbows.  Rash however also is present on the patient's neck, buttocks and face.  Patient has past medical history significant for velasquez pustular psoriasis.  Patient has been seen twice in urgent care over the last 2 weeks.  His first visit he was treated for impetigo with mupirocin and Bactrim.  He noted no improvement while on the medication.  He was then subsequently seen on 9/10/2022 and started on ketoconazole for potential fungal infection.  States he noted no improvement.  Rash is very itchy.  Occasionally drains clear/yellow discharge.  Has been trying to apply topical ambulance without improvement.  States he otherwise feels well.  No fevers, chills or myalgias.  No known new contacts or medications.  Has been taking Benadryl for the itch without improvement.  He does inform me he has follow-up with dermatology next week.    REVIEW OF SYSTEMS  Review of Systems   Constitutional:  Negative for chills, fever and malaise/fatigue.   Respiratory:  Negative for shortness of breath.    Cardiovascular:  Negative for chest pain and palpitations.   Gastrointestinal:  Negative for abdominal pain, nausea and vomiting.   Musculoskeletal:  Negative for joint pain and myalgias.   Skin:  Positive for itching and rash.   Neurological:  Negative for dizziness and headaches.     PAST MEDICAL HISTORY  Patient Active Problem List    Diagnosis Date Noted    Low libido 09/01/2022    Hair loss 02/11/2022    Other chest pain 02/11/2022    Anxiety 02/11/2022    Obesity (BMI 30-39.9) 02/11/2022    Pustular psoriasis of palm of hand 02/11/2022       SURGICAL HISTORY  patient  "denies any surgical history    ALLERGIES  No Known Allergies    CURRENT MEDICATIONS  Home Medications       Reviewed by Jose Storey P.A.-C. (Physician Assistant) on 09/15/22 at 1527  Med List Status: <None>     Medication Last Dose Status   Buprenorphine HCl-Naloxone HCl 8-2 MG FILM Taking Active   citalopram (CELEXA) 20 MG Tab Taking Active   hydrOXYzine HCl (ATARAX) 25 MG Tab Taking Active   ketoconazole (NIZORAL) 2 % Cream Not Taking Active   mupirocin (BACTROBAN) 2 % Ointment Not Taking Active                    SOCIAL HISTORY  Social History     Tobacco Use    Smoking status: Former    Smokeless tobacco: Current    Tobacco comments:     Uses patches   Vaping Use    Vaping Use: Never used   Substance and Sexual Activity    Alcohol use: Yes    Drug use: Never    Sexual activity: Not on file       FAMILY HISTORY  History reviewed. No pertinent family history.       Objective:     VITAL SIGNS: BP (!) 133/94 (BP Location: Left arm, Patient Position: Sitting, BP Cuff Size: Adult)   Pulse 84   Temp 36.6 °C (97.9 °F) (Temporal)   Resp 14   Ht 1.727 m (5' 8\")   Wt 93 kg (205 lb) Comment: with shoes  SpO2 98%   BMI 31.17 kg/m²     PHYSICAL EXAM  Physical Exam  Constitutional:       Appearance: Normal appearance.   HENT:      Head: Normocephalic and atraumatic.   Eyes:      Conjunctiva/sclera: Conjunctivae normal.   Cardiovascular:      Rate and Rhythm: Normal rate and regular rhythm.      Pulses: Normal pulses.      Heart sounds: Normal heart sounds.   Musculoskeletal:         General: Normal range of motion.      Cervical back: Normal range of motion.   Skin:     Findings: Rash present.      Comments: Patient has a diffuse erythematous papular/pustular rash with scab formation present.  Rash is predominantly over extensor surfaces of knees, elbows, buttocks and neck.  Has small areas of clear discharge present.  No signs concerning for secondary bacterial infection.   Neurological:      General: No focal " deficit present.      Mental Status: He is alert and oriented to person, place, and time. Mental status is at baseline.       Assessment/Plan:     1. Generalized psoriasis  - methylPREDNISolone (MEDROL DOSEPAK) 4 MG Tablet Therapy Pack; Follow schedule on package instructions.  Dispense: 21 Tablet; Refill: 0  - triamcinolone acetonide (KENALOG) 0.1 % Cream; Apply 1 Application topically 2 times a day for 7 days.  Dispense: 80 g; Refill: 1  - hydrOXYzine HCl (ATARAX) 25 MG Tab; Take 1 Tablet by mouth 3 times a day as needed for Itching.  Dispense: 30 Tablet; Refill: 0      MDM/Comments:    Differential diagnoses and treatment options were discussed with patient at length today.  Due to the appearance and location of the rash I believe this is likely a form of psoriasis.  Predominantly present on extensor surfaces.  He does have a history of papular pustular psoriasis.  Symptoms have been refractory to oral antibiotics, topical antibiotics and antifungals.  Trial of oral steroids and topical steroids provided.  Atarax for pruritus.  Supportive recommendations discussed.  He has follow-up next week with dermatology.    Differential diagnosis, natural history, supportive care, and indications for immediate follow-up discussed. All questions answered. Patient agrees with the plan of care.    Follow-up as needed if symptoms worsen or fail to improve to PCP, Urgent care or Emergency Room.    I have personally reviewed prior external notes and test results pertinent to today's visit.  I have independently reviewed and interpreted all diagnostics ordered during this urgent care acute visit.   Discussed management options (risks,benefits, and alternatives to treatment). Pt expresses understanding and the treatment plan was agreed upon. Questions were encouraged and answered to pt's satisfaction.    Please note that this dictation was created using voice recognition software. I have made a reasonable attempt to correct obvious  errors, but I expect that there are errors of grammar and possibly content that I did not discover before finalizing the note.

## 2022-09-20 ENCOUNTER — HOSPITAL ENCOUNTER (OUTPATIENT)
Dept: LAB | Facility: MEDICAL CENTER | Age: 42
End: 2022-09-20
Payer: COMMERCIAL

## 2022-09-20 LAB
ALBUMIN SERPL BCP-MCNC: 4.6 G/DL (ref 3.2–4.9)
ALBUMIN/GLOB SERPL: 1.6 G/DL
ALP SERPL-CCNC: 92 U/L (ref 30–99)
ALT SERPL-CCNC: 51 U/L (ref 2–50)
ANION GAP SERPL CALC-SCNC: 10 MMOL/L (ref 7–16)
AST SERPL-CCNC: 28 U/L (ref 12–45)
BASOPHILS # BLD AUTO: 0.4 % (ref 0–1.8)
BASOPHILS # BLD: 0.04 K/UL (ref 0–0.12)
BILIRUB CONJ SERPL-MCNC: <0.2 MG/DL (ref 0.1–0.5)
BILIRUB INDIRECT SERPL-MCNC: NORMAL MG/DL (ref 0–1)
BILIRUB SERPL-MCNC: 0.3 MG/DL (ref 0.1–1.5)
BUN SERPL-MCNC: 15 MG/DL (ref 8–22)
CALCIUM SERPL-MCNC: 9.1 MG/DL (ref 8.5–10.5)
CHLORIDE SERPL-SCNC: 99 MMOL/L (ref 96–112)
CO2 SERPL-SCNC: 28 MMOL/L (ref 20–33)
CREAT SERPL-MCNC: 0.65 MG/DL (ref 0.5–1.4)
EOSINOPHIL # BLD AUTO: 0.21 K/UL (ref 0–0.51)
EOSINOPHIL NFR BLD: 2.2 % (ref 0–6.9)
ERYTHROCYTE [DISTWIDTH] IN BLOOD BY AUTOMATED COUNT: 38.5 FL (ref 35.9–50)
GFR SERPLBLD CREATININE-BSD FMLA CKD-EPI: 121 ML/MIN/1.73 M 2
GLOBULIN SER CALC-MCNC: 2.8 G/DL (ref 1.9–3.5)
GLUCOSE SERPL-MCNC: 94 MG/DL (ref 65–99)
HBV CORE AB SERPL QL IA: NONREACTIVE
HBV SURFACE AB SERPL IA-ACNC: <3.5 MIU/ML (ref 0–10)
HBV SURFACE AG SER QL: NORMAL
HCT VFR BLD AUTO: 43.6 % (ref 42–52)
HCV AB SER QL: NORMAL
HGB BLD-MCNC: 15.9 G/DL (ref 14–18)
IMM GRANULOCYTES # BLD AUTO: 0.06 K/UL (ref 0–0.11)
IMM GRANULOCYTES NFR BLD AUTO: 0.6 % (ref 0–0.9)
LYMPHOCYTES # BLD AUTO: 2.15 K/UL (ref 1–4.8)
LYMPHOCYTES NFR BLD: 22.5 % (ref 22–41)
MCH RBC QN AUTO: 30.1 PG (ref 27–33)
MCHC RBC AUTO-ENTMCNC: 36.5 G/DL (ref 33.7–35.3)
MCV RBC AUTO: 82.6 FL (ref 81.4–97.8)
MONOCYTES # BLD AUTO: 0.86 K/UL (ref 0–0.85)
MONOCYTES NFR BLD AUTO: 9 % (ref 0–13.4)
NEUTROPHILS # BLD AUTO: 6.22 K/UL (ref 1.82–7.42)
NEUTROPHILS NFR BLD: 65.3 % (ref 44–72)
NRBC # BLD AUTO: 0 K/UL
NRBC BLD-RTO: 0 /100 WBC
PLATELET # BLD AUTO: 223 K/UL (ref 164–446)
PMV BLD AUTO: 10.6 FL (ref 9–12.9)
POTASSIUM SERPL-SCNC: 3.9 MMOL/L (ref 3.6–5.5)
PROT SERPL-MCNC: 7.4 G/DL (ref 6–8.2)
RBC # BLD AUTO: 5.28 M/UL (ref 4.7–6.1)
SODIUM SERPL-SCNC: 137 MMOL/L (ref 135–145)
WBC # BLD AUTO: 9.5 K/UL (ref 4.8–10.8)

## 2022-09-20 PROCEDURE — 86706 HEP B SURFACE ANTIBODY: CPT

## 2022-09-20 PROCEDURE — 87340 HEPATITIS B SURFACE AG IA: CPT

## 2022-09-20 PROCEDURE — 86803 HEPATITIS C AB TEST: CPT

## 2022-09-20 PROCEDURE — 82248 BILIRUBIN DIRECT: CPT

## 2022-09-20 PROCEDURE — 86704 HEP B CORE ANTIBODY TOTAL: CPT

## 2022-09-20 PROCEDURE — 80053 COMPREHEN METABOLIC PANEL: CPT

## 2022-09-20 PROCEDURE — 86480 TB TEST CELL IMMUN MEASURE: CPT

## 2022-09-20 PROCEDURE — 36415 COLL VENOUS BLD VENIPUNCTURE: CPT

## 2022-09-20 PROCEDURE — 85025 COMPLETE CBC W/AUTO DIFF WBC: CPT

## 2022-09-21 LAB
GAMMA INTERFERON BACKGROUND BLD IA-ACNC: 0.04 IU/ML
M TB IFN-G BLD-IMP: NEGATIVE
M TB IFN-G CD4+ BCKGRND COR BLD-ACNC: 0 IU/ML
MITOGEN IGNF BCKGRD COR BLD-ACNC: >10 IU/ML
QFT TB2 - NIL TBQ2: 0 IU/ML

## 2022-12-15 ENCOUNTER — HOSPITAL ENCOUNTER (OUTPATIENT)
Dept: BEHAVIORAL HEALTH | Facility: MEDICAL CENTER | Age: 42
End: 2022-12-15
Attending: FAMILY MEDICINE
Payer: COMMERCIAL

## 2022-12-15 ENCOUNTER — DOCUMENTATION (OUTPATIENT)
Dept: BEHAVIORAL HEALTH | Facility: MEDICAL CENTER | Age: 42
End: 2022-12-15
Payer: COMMERCIAL

## 2022-12-15 VITALS — DIASTOLIC BLOOD PRESSURE: 80 MMHG | SYSTOLIC BLOOD PRESSURE: 144 MMHG | HEART RATE: 88 BPM | OXYGEN SATURATION: 91 %

## 2022-12-15 DIAGNOSIS — F11.20 UNCOMPLICATED OPIOID DEPENDENCE (HCC): ICD-10-CM

## 2022-12-15 DIAGNOSIS — L40.9 PSORIASIS: ICD-10-CM

## 2022-12-15 DIAGNOSIS — D84.821 DRUG-INDUCED IMMUNODEFICIENCY (HCC): ICD-10-CM

## 2022-12-15 DIAGNOSIS — Z79.899 HIGH RISK MEDICATION USE: ICD-10-CM

## 2022-12-15 DIAGNOSIS — Z79.899 DRUG-INDUCED IMMUNODEFICIENCY (HCC): ICD-10-CM

## 2022-12-15 PROCEDURE — 99214 OFFICE O/P EST MOD 30 MIN: CPT | Performed by: FAMILY MEDICINE

## 2022-12-15 RX ORDER — APREMILAST 30 MG/1
30 TABLET, FILM COATED ORAL
Qty: 60 TABLET | Refills: 3
Start: 2022-12-15 | End: 2023-09-13 | Stop reason: SDUPTHER

## 2022-12-15 RX ORDER — BUPRENORPHINE AND NALOXONE 8; 2 MG/1; MG/1
8 FILM, SOLUBLE BUCCAL; SUBLINGUAL 2 TIMES DAILY
Qty: 60 EACH | Refills: 0 | Status: SHIPPED | OUTPATIENT
Start: 2022-12-15 | End: 2023-01-12 | Stop reason: SDUPTHER

## 2022-12-15 ASSESSMENT — ENCOUNTER SYMPTOMS
MYALGIAS: 0
COUGH: 0
HEMOPTYSIS: 0
NEUROLOGICAL NEGATIVE: 1
CONSTITUTIONAL NEGATIVE: 1
CARDIOVASCULAR NEGATIVE: 1
DEPRESSION: 0
HEARTBURN: 0
MUSCULOSKELETAL NEGATIVE: 1
BLURRED VISION: 0
PALPITATIONS: 0
PSYCHIATRIC NEGATIVE: 1
FEVER: 0
EYES NEGATIVE: 1
DOUBLE VISION: 0
NAUSEA: 0
GASTROINTESTINAL NEGATIVE: 1
HEADACHES: 0
BRUISES/BLEEDS EASILY: 0
RESPIRATORY NEGATIVE: 1
TINGLING: 0
CHILLS: 0
DIZZINESS: 0

## 2022-12-15 NOTE — PROGRESS NOTES
Psychiatric Progress Note               Author: Otto Haines M.D. Date & Time created: 12/15/2022  3:05 PM     Interval History:  41 y/o wm who works as a . Over ten years ago got dependence to opiates from recreational pill use. Was switched to suboxone and has been clean off of pills since then. His prior provider is retiring and would like to get care here.  Will need hiv and uds, had recent normal labs to start DMARD for psoriasis from derm  Today the patient was counseled on avoidance of people, places and things that could be triggers for substance use  f/u in 4 weeks for efficacy      Review of Systems:  Review of Systems   Constitutional: Negative.  Negative for chills and fever.   HENT: Negative.  Negative for hearing loss.    Eyes: Negative.  Negative for blurred vision and double vision.   Respiratory: Negative.  Negative for cough and hemoptysis.    Cardiovascular: Negative.  Negative for chest pain and palpitations.   Gastrointestinal: Negative.  Negative for heartburn and nausea.   Genitourinary: Negative.  Negative for dysuria.   Musculoskeletal: Negative.  Negative for myalgias.   Skin:  Positive for itching and rash.   Neurological: Negative.  Negative for dizziness, tingling and headaches.   Endo/Heme/Allergies: Negative.  Does not bruise/bleed easily.   Psychiatric/Behavioral: Negative.  Negative for depression and suicidal ideas.    All other systems reviewed and are negative.    Physical Exam:  Physical Exam  Vitals and nursing note reviewed.   Constitutional:       General: He is not in acute distress.     Appearance: He is well-developed. He is not diaphoretic.   HENT:      Head: Normocephalic and atraumatic.      Mouth/Throat:      Pharynx: No oropharyngeal exudate.   Eyes:      Pupils: Pupils are equal, round, and reactive to light.   Cardiovascular:      Rate and Rhythm: Normal rate and regular rhythm.      Heart sounds: Normal heart sounds. No murmur heard.     No friction rub. No gallop.   Pulmonary:      Effort: Pulmonary effort is normal. No respiratory distress.      Breath sounds: Normal breath sounds. No wheezing or rales.   Chest:      Chest wall: No tenderness.   Skin:     Findings: Rash present. Rash is macular and scaling.          Neurological:      Mental Status: He is alert and oriented to person, place, and time.   Psychiatric:         Behavior: Behavior normal.         Thought Content: Thought content normal.         Judgment: Judgment normal.       Labs:  No results found for this or any previous visit (from the past 24 hour(s)).    Hemodynamics:  No data recorded.     Pulse  Av  Min: 88  Max: 88  Blood Pressure: (!) 144/80     Respiratory:    Pulse Oximetry: 91 %           Fluids:  No intake or output data in the 24 hours ending 12/15/22 1505     GI/Nutrition:  No orders of the defined types were placed in this encounter.    Medications:  Current Outpatient Medications   Medication    Apremilast (OTEZLA) 30 MG Tab    Buprenorphine HCl-Naloxone HCl 8-2 MG FILM    hydrOXYzine HCl (ATARAX) 25 MG Tab    citalopram (CELEXA) 20 MG Tab     No current facility-administered medications for this encounter.     Medical Decision Making, by Problem:  There are no active hospital problems to display for this patient.    Plan:      1. Uncomplicated opioid dependence (HCC)  Buprenorphine HCl-Naloxone HCl 8-2 MG FILM    Consent for Opiate Prescription    PAIN MANAGEMENT SCRN, W/ RFLX TO QNT    HIV AG/AB COMBO ASSAY SCREENING      2. Psoriasis  Apremilast (OTEZLA) 30 MG Tab      3. High risk medication use  Buprenorphine HCl-Naloxone HCl 8-2 MG FILM    Consent for Opiate Prescription    PAIN MANAGEMENT SCRN, W/ RFLX TO QNT    HIV AG/AB COMBO ASSAY SCREENING      4. Drug-induced immunodeficiency (HCC)

## 2023-01-09 ENCOUNTER — HOSPITAL ENCOUNTER (OUTPATIENT)
Dept: LAB | Facility: MEDICAL CENTER | Age: 43
End: 2023-01-09
Attending: FAMILY MEDICINE
Payer: COMMERCIAL

## 2023-01-09 DIAGNOSIS — Z79.899 HIGH RISK MEDICATION USE: ICD-10-CM

## 2023-01-09 DIAGNOSIS — F11.20 UNCOMPLICATED OPIOID DEPENDENCE (HCC): ICD-10-CM

## 2023-01-09 LAB — HIV 1+2 AB+HIV1 P24 AG SERPL QL IA: NORMAL

## 2023-01-09 PROCEDURE — 80307 DRUG TEST PRSMV CHEM ANLYZR: CPT

## 2023-01-09 PROCEDURE — G0480 DRUG TEST DEF 1-7 CLASSES: HCPCS

## 2023-01-09 PROCEDURE — 87389 HIV-1 AG W/HIV-1&-2 AB AG IA: CPT

## 2023-01-09 PROCEDURE — 36415 COLL VENOUS BLD VENIPUNCTURE: CPT

## 2023-01-11 LAB
AMPHET CTO UR CFM-MCNC: NEGATIVE NG/ML
BARBITURATES CTO UR CFM-MCNC: NEGATIVE NG/ML
BENZODIAZ CTO UR CFM-MCNC: NEGATIVE NG/ML
BUPRENORPHINE UR-MCNC: POSITIVE NG/ML
CANNABINOIDS CTO UR CFM-MCNC: NEGATIVE NG/ML
CARISOPRODOL UR-MCNC: NEGATIVE NG/ML
COCAINE CTO UR CFM-MCNC: NEGATIVE NG/ML
DRUG SCREEN COMMENT UR-IMP: NORMAL
ETHYL GLUCURONIDE UR QL SCN: POSITIVE NG/ML
FENTANYL UR-MCNC: NEGATIVE NG/ML
MEPERIDINE CTO UR SCN-MCNC: NEGATIVE NG/ML
METHADONE CTO UR CFM-MCNC: NEGATIVE NG/ML
OPIATES UR QL SCN: NEGATIVE NG/ML
OXYCDOXYM URSCRN 2005102: NEGATIVE NG/ML
PCP CTO UR CFM-MCNC: NEGATIVE NG/ML
PROPOXYPH CTO UR CFM-MCNC: NEGATIVE NG/ML
TAPENTADOL UR-MCNC: NEGATIVE NG/ML
TRAMADOL CTO UR SCN-MCNC: NEGATIVE NG/ML
ZOLPIDEM UR-MCNC: NEGATIVE NG/ML

## 2023-01-12 ENCOUNTER — HOSPITAL ENCOUNTER (OUTPATIENT)
Dept: BEHAVIORAL HEALTH | Facility: MEDICAL CENTER | Age: 43
End: 2023-01-12
Attending: FAMILY MEDICINE
Payer: COMMERCIAL

## 2023-01-12 VITALS — SYSTOLIC BLOOD PRESSURE: 126 MMHG | HEART RATE: 91 BPM | DIASTOLIC BLOOD PRESSURE: 68 MMHG | OXYGEN SATURATION: 95 %

## 2023-01-12 DIAGNOSIS — Z79.899 HIGH RISK MEDICATION USE: ICD-10-CM

## 2023-01-12 DIAGNOSIS — F11.20 UNCOMPLICATED OPIOID DEPENDENCE (HCC): ICD-10-CM

## 2023-01-12 LAB
BUPRENO GLUC URQNT 2010093: 227 NG/ML
BUPRENORPH URQNT L34157: 7 NG/ML
BUPRENORPHINE UR-MCNC: 422 NG/ML
NALOX URQNT 2005863: <100 NG/ML
NORBUPR GLUC URQNT 2010094: 577 NG/ML

## 2023-01-12 PROCEDURE — 99214 OFFICE O/P EST MOD 30 MIN: CPT | Performed by: FAMILY MEDICINE

## 2023-01-12 RX ORDER — BUPRENORPHINE AND NALOXONE 8; 2 MG/1; MG/1
8 FILM, SOLUBLE BUCCAL; SUBLINGUAL 2 TIMES DAILY
Qty: 60 EACH | Refills: 0 | Status: SHIPPED | OUTPATIENT
Start: 2023-01-12 | End: 2023-02-07 | Stop reason: SDUPTHER

## 2023-01-12 ASSESSMENT — ENCOUNTER SYMPTOMS
CONSTITUTIONAL NEGATIVE: 1
DOUBLE VISION: 0
MUSCULOSKELETAL NEGATIVE: 1
FEVER: 0
COUGH: 0
NEUROLOGICAL NEGATIVE: 1
RESPIRATORY NEGATIVE: 1
HEARTBURN: 0
DEPRESSION: 0
EYES NEGATIVE: 1
HEADACHES: 0
TINGLING: 0
PALPITATIONS: 0
BRUISES/BLEEDS EASILY: 0
DIZZINESS: 0
CARDIOVASCULAR NEGATIVE: 1
GASTROINTESTINAL NEGATIVE: 1
HEMOPTYSIS: 0
BLURRED VISION: 0
CHILLS: 0
MYALGIAS: 0
PSYCHIATRIC NEGATIVE: 1
NAUSEA: 0

## 2023-01-13 NOTE — PROGRESS NOTES
Psychiatric Progress Note               Author: Otto Haines M.D. Date & Time created: 1/12/2023  4:05 PM     Interval History:  No chief complaint on file.      HISTORY OF PRESENT ILLNESS: Patient is a 42 y.o. male established patient who presents today for a med refill of a controlled substance in addition to their other issues listed in the rest of the progress note    The patient is requesting medication for the following issue: refill of suboxone for opiate use disorder  Approximate date of onset of condition was years ago.    Current Problems:    Opiate use  disorder      Patient Active Problem List    Diagnosis Date Noted    Uncomplicated opioid dependence (HCC) 12/15/2022    Psoriasis 12/15/2022    High risk medication use 12/15/2022    Drug-induced immunodeficiency (HCC) 12/15/2022    Low libido 09/01/2022    Hair loss 02/11/2022    Other chest pain 02/11/2022    Anxiety 02/11/2022    Obesity (BMI 30-39.9) 02/11/2022    Pustular psoriasis of palm of hand 02/11/2022       Allergies:Patient has no known allergies.    Current Outpatient Medications   Medication Sig Dispense Refill    Buprenorphine HCl-Naloxone HCl 8-2 MG FILM Take 8 mg of opioid by mouth 2 times a day for 30 days. 60 Each 0    Apremilast (OTEZLA) 30 MG Tab Take 30 mg by mouth 2 times a day. 60 Tablet 3    hydrOXYzine HCl (ATARAX) 25 MG Tab Take 1 Tablet by mouth 3 times a day as needed for Itching. 30 Tablet 0    citalopram (CELEXA) 20 MG Tab Take 1 Tablet by mouth every day. For anxiety 90 Tablet 3     No current facility-administered medications for this encounter.         I have discussed with the patient that with any controlled substance prescription it is vital to have these medications in a safe, secure environment. I have discussed that it is their responsibility that their medications are not accessible to anyone else who may use them inadvertently.    I have discussed with the patient that any controlled substance can impair the  ability to drive or operate any machinery and that the patient should not use them if they plan on driving or operating machinery.    I have reviewed and updated the past medical/surgical, family history and social history as of today.    ROS:  Review of Systems   Constitutional: Negative for fever, chills, weight loss and malaise/fatigue.   Respiratory: Negative for cough, sputum production, shortness of breath and wheezing.    Cardiovascular: Negative for chest pain, palpitations, orthopnea and leg swelling.   Gastrointestinal: Negative for heartburn, nausea, vomiting, constipation and abdominal pain.  Musculoskeletal:  neg  Skin: Negative for rash and itching.   Neurological: neg  Psychiatric/Behavioral: Negative for signs or symptoms of depression,, suicidal or homicidal ideation.  Patient able to contract for their safety.   All other systems reviewed and are negative except as in HPI.      Exam:  /68 (BP Location: Left arm, Patient Position: Sitting, BP Cuff Size: Adult)   Pulse 91   SpO2 95%   General:  male patient who appears in no distress        DISCUSSION OF CARE PLAN:    - I discussed management options. I reviewed symptomatic care     - I will obtain/review additional records if needed    - I discussed efforts with home exercise program and activity modification     - I reviewed medication monitoring.       The patient was advised to avoid alcohol use with prescribed medication. I counseled the patient regarding risks, side effects, and interactions of medications. I counseled the patient on the controlled substance prescribing program. I reviewed further symptomatic medications.  - I reviewed additional diagnostic options: Yes.  - I reviewed additional therapeutic options: Yes  - I reviewed psychosocial interventions:  Yes      Assessment/Plan:  1. Uncomplicated opioid dependence (HCC)  Buprenorphine HCl-Naloxone HCl 8-2 MG FILM      2. High risk medication use  Buprenorphine HCl-Naloxone HCl  8-2 MG FILM          A Controlled Substance Agreement has been signed within the last year. A urine drug screen has been done in the past year.      The patient reports that their insomnia is well controlled with the current treatment plan  They were counseled on other means to help with sleep   This patient is continuing to use a controlled substance (CS) on a long term basis.  The patient is thoroughly aware of the goals of treatment with the CS  The patient is aware that yearly and random urine drug screens are required.  The patient has been instructed to take the CS only as prescribed.  The patient is prohibited from sharing the CS with any other person.  The patient is instructed to inform the provider if any other CS is taken, of any alcohol or cannabis or other recreational drug use, any treatment for side effects of the CS or complications, if they have CS active rx in other states  The patient has evidence for a reason for the CS  The treatment plan has been discussed with the patient  The  report has been reviewed      The patient reports that their current dosage of suboxone is controlling their cravings and has been able to refrain from any illicit opiate use. We will continue with current dose and f/u in 4 weeks for efficacy          Review of Systems:  Review of Systems   Constitutional: Negative.  Negative for chills and fever.   HENT: Negative.  Negative for hearing loss.    Eyes: Negative.  Negative for blurred vision and double vision.   Respiratory: Negative.  Negative for cough and hemoptysis.    Cardiovascular: Negative.  Negative for chest pain and palpitations.   Gastrointestinal: Negative.  Negative for heartburn and nausea.   Genitourinary: Negative.  Negative for dysuria.   Musculoskeletal: Negative.  Negative for myalgias.   Skin: Negative.  Negative for rash.   Neurological: Negative.  Negative for dizziness, tingling and headaches.   Endo/Heme/Allergies: Negative.  Does not  bruise/bleed easily.   Psychiatric/Behavioral: Negative.  Negative for depression and suicidal ideas.    All other systems reviewed and are negative.    Physical Exam:  Physical Exam  Vitals and nursing note reviewed.   Constitutional:       General: He is not in acute distress.     Appearance: He is well-developed. He is not diaphoretic.   HENT:      Head: Normocephalic and atraumatic.      Mouth/Throat:      Pharynx: No oropharyngeal exudate.   Eyes:      Pupils: Pupils are equal, round, and reactive to light.   Cardiovascular:      Rate and Rhythm: Normal rate and regular rhythm.      Heart sounds: Normal heart sounds. No murmur heard.    No friction rub. No gallop.   Pulmonary:      Effort: Pulmonary effort is normal. No respiratory distress.      Breath sounds: Normal breath sounds. No wheezing or rales.   Chest:      Chest wall: No tenderness.   Neurological:      Mental Status: He is alert and oriented to person, place, and time.   Psychiatric:         Behavior: Behavior normal.         Thought Content: Thought content normal.         Judgment: Judgment normal.       Labs:  No results found for this or any previous visit (from the past 24 hour(s)).    Hemodynamics:  No data recorded.     Pulse  Av  Min: 91  Max: 91  Blood Pressure: 126/68     Respiratory:    Pulse Oximetry: 95 %           Fluids:  No intake or output data in the 24 hours ending 23 1605     GI/Nutrition:  No orders of the defined types were placed in this encounter.    Medications:  Current Outpatient Medications   Medication    Buprenorphine HCl-Naloxone HCl 8-2 MG FILM    Apremilast (OTEZLA) 30 MG Tab    hydrOXYzine HCl (ATARAX) 25 MG Tab    citalopram (CELEXA) 20 MG Tab     No current facility-administered medications for this encounter.     Medical Decision Making, by Problem:  There are no active hospital problems to display for this patient.    Plan:    1. Uncomplicated opioid dependence (HCC)  Buprenorphine HCl-Naloxone HCl  8-2 MG FILM      2. High risk medication use  Buprenorphine HCl-Naloxone HCl 8-2 MG FILM

## 2023-01-17 LAB
ETHYL GLUCURONIDE UR CFM-MCNC: 1388 NG/ML
ETHYL SULFATE UR CFM-MCNC: 431 NG/ML

## 2023-01-24 ENCOUNTER — OFFICE VISIT (OUTPATIENT)
Dept: MEDICAL GROUP | Facility: PHYSICIAN GROUP | Age: 43
End: 2023-01-24
Payer: COMMERCIAL

## 2023-01-24 VITALS
TEMPERATURE: 97.4 F | DIASTOLIC BLOOD PRESSURE: 70 MMHG | OXYGEN SATURATION: 96 % | HEART RATE: 88 BPM | SYSTOLIC BLOOD PRESSURE: 128 MMHG

## 2023-01-24 DIAGNOSIS — U07.1 POSITIVE SELF-ADMINISTERED ANTIGEN TEST FOR COVID-19: ICD-10-CM

## 2023-01-24 DIAGNOSIS — Z11.59 ENCOUNTER FOR SCREENING FOR OTHER VIRAL DISEASES: ICD-10-CM

## 2023-01-24 LAB
EXTERNAL QUALITY CONTROL: ABNORMAL
INT CON NEG: NEGATIVE
INT CON POS: POSITIVE
SARS-COV+SARS-COV-2 AG RESP QL IA.RAPID: POSITIVE

## 2023-01-24 PROCEDURE — 87426 SARSCOV CORONAVIRUS AG IA: CPT | Performed by: FAMILY MEDICINE

## 2023-01-24 PROCEDURE — 99213 OFFICE O/P EST LOW 20 MIN: CPT | Mod: CS | Performed by: FAMILY MEDICINE

## 2023-01-24 RX ORDER — CLOBETASOL PROPIONATE 0.5 MG/G
OINTMENT TOPICAL
COMMUNITY
Start: 2022-11-18

## 2023-01-24 RX ORDER — APREMILAST 10-20-30MG
KIT ORAL
COMMUNITY
Start: 2022-12-01

## 2023-01-24 NOTE — LETTER
January 24, 2023    To whom it may concern:     Harry Dahl was seen by me in clinic today. Please excuse him from work                                 Danny Ryan M.D.

## 2023-01-24 NOTE — ASSESSMENT & PLAN NOTE
Home covid test was positive.   Clinic covid test also positive today.   He has no trouble breathing.   He has body pain, fatigue. He has no trouble breathing.   Supportive care.   Off work note.

## 2023-01-24 NOTE — PROGRESS NOTES
Subjective:   Harry Dahl is a 42 y.o. male here today for evaluation and management of:     Positive self-administered antigen test for COVID-19  Home covid test was positive.   Clinic covid test also positive today.   He has no trouble breathing.   He has body pain, fatigue. He has no trouble breathing.   Supportive care.   Off work note.              Current medicines (including changes today)  Current Outpatient Medications   Medication Sig Dispense Refill    Buprenorphine HCl-Naloxone HCl 8-2 MG FILM Take 8 mg of opioid by mouth 2 times a day for 30 days. 60 Each 0    Apremilast (OTEZLA) 30 MG Tab Take 30 mg by mouth 2 times a day. 60 Tablet 3    hydrOXYzine HCl (ATARAX) 25 MG Tab Take 1 Tablet by mouth 3 times a day as needed for Itching. 30 Tablet 0    citalopram (CELEXA) 20 MG Tab Take 1 Tablet by mouth every day. For anxiety 90 Tablet 3     No current facility-administered medications for this visit.     He  has a past medical history of Anxiety and Psoriasis.    ROS  No chest pain, no shortness of breath, no abdominal pain       Objective:     /70   Pulse 88   Temp 36.3 °C (97.4 °F)   SpO2 96%  There is no height or weight on file to calculate BMI.   Physical Exam:  Constitutional: Alert, no distress, well-groomed.  Skin: No rashes in visible areas.  Eye: Round. Conjunctiva clear, lids normal. No icterus.   ENMT: Lips pink without lesions, good dentition, moist mucous membranes. Phonation normal.  Neck: No masses, no thyromegaly. Moves freely without pain.  Respiratory: Unlabored respiratory effort, no cough or audible wheeze  Psych: Alert and oriented x3, normal affect and mood.          Assessment and Plan:   The following treatment plan was discussed    1. Encounter for screening for other viral diseases  - POCT SARS-COV Antigen JEFFY (Symptomatic only)    2. Positive self-administered antigen test for COVID-19      Followup: No follow-ups on file.

## 2023-02-07 DIAGNOSIS — F11.20 UNCOMPLICATED OPIOID DEPENDENCE (HCC): ICD-10-CM

## 2023-02-07 DIAGNOSIS — Z79.899 HIGH RISK MEDICATION USE: ICD-10-CM

## 2023-02-07 RX ORDER — BUPRENORPHINE AND NALOXONE 8; 2 MG/1; MG/1
8 FILM, SOLUBLE BUCCAL; SUBLINGUAL 2 TIMES DAILY
Qty: 60 EACH | Refills: 0 | Status: SHIPPED | OUTPATIENT
Start: 2023-02-07 | End: 2023-03-06

## 2023-02-09 ENCOUNTER — APPOINTMENT (OUTPATIENT)
Dept: RADIOLOGY | Facility: IMAGING CENTER | Age: 43
End: 2023-02-09
Payer: COMMERCIAL

## 2023-02-09 ENCOUNTER — OFFICE VISIT (OUTPATIENT)
Dept: URGENT CARE | Facility: PHYSICIAN GROUP | Age: 43
End: 2023-02-09
Payer: COMMERCIAL

## 2023-02-09 VITALS
OXYGEN SATURATION: 96 % | TEMPERATURE: 96.7 F | HEIGHT: 68 IN | BODY MASS INDEX: 31.07 KG/M2 | SYSTOLIC BLOOD PRESSURE: 132 MMHG | RESPIRATION RATE: 16 BRPM | WEIGHT: 205 LBS | DIASTOLIC BLOOD PRESSURE: 72 MMHG | HEART RATE: 155 BPM

## 2023-02-09 DIAGNOSIS — S49.92XA INJURY OF LEFT SHOULDER, INITIAL ENCOUNTER: ICD-10-CM

## 2023-02-09 DIAGNOSIS — S42.002A CLOSED DISPLACED FRACTURE OF LEFT CLAVICLE, UNSPECIFIED PART OF CLAVICLE, INITIAL ENCOUNTER: ICD-10-CM

## 2023-02-09 PROCEDURE — 99214 OFFICE O/P EST MOD 30 MIN: CPT

## 2023-02-09 PROCEDURE — 73000 X-RAY EXAM OF COLLAR BONE: CPT | Mod: TC,FY,LT | Performed by: RADIOLOGY

## 2023-02-09 RX ORDER — KETOROLAC TROMETHAMINE 30 MG/ML
60 INJECTION, SOLUTION INTRAMUSCULAR; INTRAVENOUS ONCE
Status: COMPLETED | OUTPATIENT
Start: 2023-02-09 | End: 2023-02-09

## 2023-02-09 RX ADMIN — KETOROLAC TROMETHAMINE 60 MG: 30 INJECTION, SOLUTION INTRAMUSCULAR; INTRAVENOUS at 11:57

## 2023-02-09 ASSESSMENT — PAIN SCALES - GENERAL: PAINLEVEL: 10=SEVERE PAIN

## 2023-02-09 ASSESSMENT — FIBROSIS 4 INDEX: FIB4 SCORE: 0.72

## 2023-02-09 NOTE — PROGRESS NOTES
Subjective:   Harry Dahl is a 42 y.o. male who presents for Shoulder Injury (Fell on shoulder last night. Limited movement with pain )      HPI: This is a 42-year-old male who presents today for evaluation after a fall.  Patient reports developing pain and swelling over his left clavicle after falling last night.  Patient reports that he was intoxicated last night and fell forward onto his left shoulder.  He denies hitting his head.  No loss of consciousness.  No use of blood thinners, vision changes, or headache. He reports pain is severe. He has not taken anything for his pain. He denies numbness and tingling to left hand.       ROS Per HPI    Medications:    Current Outpatient Medications on File Prior to Visit   Medication Sig Dispense Refill    Buprenorphine HCl-Naloxone HCl 8-2 MG FILM Take 8 mg of opioid by mouth 2 times a day for 30 days. 60 Each 0    OTEZLA 10 & 20 & 30 MG Tablet Therapy Pack FOLLOW PACKAGE DIRECTIONS      clobetasol (TEMOVATE) 0.05 % Ointment       Apremilast (OTEZLA) 30 MG Tab Take 30 mg by mouth 2 times a day. 60 Tablet 3    hydrOXYzine HCl (ATARAX) 25 MG Tab Take 1 Tablet by mouth 3 times a day as needed for Itching. 30 Tablet 0    citalopram (CELEXA) 20 MG Tab Take 1 Tablet by mouth every day. For anxiety 90 Tablet 3     No current facility-administered medications on file prior to visit.        Allergies:   Patient has no known allergies.    Problem List:   Patient Active Problem List   Diagnosis    Hair loss    Other chest pain    Anxiety    Obesity (BMI 30-39.9)    Pustular psoriasis of palm of hand    Low libido    Uncomplicated opioid dependence (HCC)    Psoriasis    High risk medication use    Drug-induced immunodeficiency (HCC)    Positive self-administered antigen test for COVID-19        Surgical History:  No past surgical history on file.    Past Social Hx:   Social History     Tobacco Use    Smoking status: Former    Smokeless tobacco: Current    Tobacco comments:      "Uses patches   Vaping Use    Vaping Use: Never used   Substance Use Topics    Alcohol use: Yes    Drug use: Never          Problem list, medications, and allergies reviewed by myself today in Epic.     Objective:     /72   Pulse (!) 155   Temp 35.9 °C (96.7 °F) (Temporal)   Resp 16   Ht 1.727 m (5' 8\")   Wt 93 kg (205 lb)   SpO2 96%   BMI 31.17 kg/m²     Physical Exam  Vitals and nursing note reviewed.   Constitutional:       General: He is not in acute distress.     Appearance: Normal appearance. He is normal weight. He is not ill-appearing, toxic-appearing or diaphoretic.   HENT:      Head: Normocephalic and atraumatic.   Cardiovascular:      Rate and Rhythm: Normal rate and regular rhythm.      Pulses: Normal pulses.      Heart sounds: Normal heart sounds. No murmur heard.    No friction rub. No gallop.   Pulmonary:      Effort: Pulmonary effort is normal. No respiratory distress.      Breath sounds: Normal breath sounds. No stridor. No wheezing, rhonchi or rales.   Chest:      Chest wall: No tenderness.   Musculoskeletal:      Left shoulder: No swelling, deformity, tenderness, bony tenderness or crepitus. Normal range of motion.        Arms:       Comments: +TTP, obvious deformity of left clavicle, +swelling    Skin:     General: Skin is warm and dry.      Capillary Refill: Capillary refill takes less than 2 seconds.   Neurological:      General: No focal deficit present.      Mental Status: He is alert and oriented to person, place, and time. Mental status is at baseline.   Psychiatric:         Mood and Affect: Mood normal.         Behavior: Behavior normal.         Thought Content: Thought content normal.         Judgment: Judgment normal.       Assessment/Plan:     Diagnosis and associated orders:   1. Closed displaced fracture of left clavicle, unspecified part of clavicle, initial encounter  ketorolac (TORADOL) injection 60 mg    DX-CLAVICLE LEFT    Referral to Orthopedics      DX LEFT " CLAVICLE:  FINDINGS:  There is a comminuted, displaced fracture through the mid left clavicle. There is greater than one bone width inferior displacement of the lateral fracture fragment. The acromioclavicular joint appears intact.     IMPRESSION:     Comminuted, displaced fracture of the mid left clavicle.      Comments/MDM:   Pt is clinically stable at today's acute urgent care visit.  No acute distress noted. Appropriate for outpatient management at this time.     Acute problem.  Dx left clavicle shows comminuted displaced fracture of the mid left clavicle.  Patient placed in sling.  Toradol 60mg IM given in clinic.  Discussed alternating Tylenol and ibuprofen, maintaining sling, ice application.  Urgent referral to orthopedic surgeon placed.  They are to follow-up with Orthopedic surgeon for further evaluation and management. Patient is agreeable and verbalized good understanding.            Discussed DDx, management options (risks,benefits, and alternatives to planned treatment), natural progression and supportive care.  Expressed understanding and the treatment plan was agreed upon. Questions were encouraged and answered   Return to urgent care prn if new or worsening sx or if there is no improvement in condition prn.    Educated in Red flags and indications to immediately call 911 or present to the Emergency Department.   Advised the patient to follow-up with the primary care physician for recheck, reevaluation, and consideration of further management.    I personally reviewed prior external notes and test results pertinent to today's visit.  I have independently reviewed and interpreted all diagnostics ordered during this urgent care acute visit.       Please note that this dictation was created using voice recognition software. I have made a reasonable attempt to correct obvious errors, but I expect that there are errors of grammar and possibly content that I did not discover before finalizing the  note.    This note was electronically signed by REMI Vazquez

## 2023-02-13 PROBLEM — S42.022A DISPLACED FRACTURE OF SHAFT OF LEFT CLAVICLE, INITIAL ENCOUNTER FOR CLOSED FRACTURE: Status: ACTIVE | Noted: 2023-02-13

## 2023-03-05 DIAGNOSIS — F11.20 UNCOMPLICATED OPIOID DEPENDENCE (HCC): ICD-10-CM

## 2023-03-05 DIAGNOSIS — Z79.899 HIGH RISK MEDICATION USE: ICD-10-CM

## 2023-03-06 RX ORDER — BUPRENORPHINE AND NALOXONE 8; 2 MG/1; MG/1
FILM, SOLUBLE BUCCAL; SUBLINGUAL
Qty: 60 EACH | Refills: 0 | Status: SHIPPED | OUTPATIENT
Start: 2023-03-06 | End: 2023-04-05

## 2023-03-06 NOTE — TELEPHONE ENCOUNTER
Received request via: Pharmacy    Was the patient seen in the last year in this department? Yes    Does the patient have an active prescription (recently filled or refills available) for medication(s) requested? No    Does the patient have [facility] Plus and need 100 day supply (blood pressure, diabetes and cholesterol meds only)? Medication is not for cholesterol, blood pressure or diabetes and Patient does not have SCP

## 2023-04-13 ENCOUNTER — HOSPITAL ENCOUNTER (OUTPATIENT)
Dept: BEHAVIORAL HEALTH | Facility: MEDICAL CENTER | Age: 43
End: 2023-04-13
Attending: FAMILY MEDICINE
Payer: COMMERCIAL

## 2023-04-13 VITALS — DIASTOLIC BLOOD PRESSURE: 78 MMHG | OXYGEN SATURATION: 97 % | HEART RATE: 85 BPM | SYSTOLIC BLOOD PRESSURE: 126 MMHG

## 2023-04-13 DIAGNOSIS — F11.20 UNCOMPLICATED OPIOID DEPENDENCE (HCC): ICD-10-CM

## 2023-04-13 PROCEDURE — 99214 OFFICE O/P EST MOD 30 MIN: CPT | Performed by: FAMILY MEDICINE

## 2023-04-13 RX ORDER — BUPRENORPHINE AND NALOXONE 8; 2 MG/1; MG/1
16 FILM, SOLUBLE BUCCAL; SUBLINGUAL DAILY
Qty: 60 EACH | Refills: 0 | Status: SHIPPED | OUTPATIENT
Start: 2023-04-13 | End: 2023-05-08

## 2023-04-13 ASSESSMENT — ENCOUNTER SYMPTOMS
HEADACHES: 0
BRUISES/BLEEDS EASILY: 0
DEPRESSION: 0
RESPIRATORY NEGATIVE: 1
BLURRED VISION: 0
DOUBLE VISION: 0
NEUROLOGICAL NEGATIVE: 1
CONSTITUTIONAL NEGATIVE: 1
COUGH: 0
GASTROINTESTINAL NEGATIVE: 1
PALPITATIONS: 0
FEVER: 0
TINGLING: 0
DIZZINESS: 0
EYES NEGATIVE: 1
MYALGIAS: 0
CHILLS: 0
HEARTBURN: 0
HEMOPTYSIS: 0
CARDIOVASCULAR NEGATIVE: 1
PSYCHIATRIC NEGATIVE: 1
NAUSEA: 0
MUSCULOSKELETAL NEGATIVE: 1

## 2023-04-13 NOTE — PROGRESS NOTES
Psychiatric Progress Note               Author: Otto Haines M.D. Date & Time created: 4/13/2023  3:39 PM     Interval History:  No chief complaint on file.      HISTORY OF PRESENT ILLNESS: Patient is a 42 y.o. male established patient who presents today for a med refill of a controlled substance in addition to their other issues listed in the rest of the progress note    The patient is requesting medication for the following issue: refill of suboxone for opiate use disorder  Approximate date of onset of condition was years ago.    Current Problems:    Opiate use  disorder      Patient Active Problem List    Diagnosis Date Noted    Displaced fracture of shaft of left clavicle, initial encounter for closed fracture 02/13/2023    Positive self-administered antigen test for COVID-19 01/24/2023    Uncomplicated opioid dependence (HCC) 12/15/2022    Psoriasis 12/15/2022    High risk medication use 12/15/2022    Drug-induced immunodeficiency (HCC) 12/15/2022    Low libido 09/01/2022    Hair loss 02/11/2022    Other chest pain 02/11/2022    Anxiety 02/11/2022    Obesity (BMI 30-39.9) 02/11/2022    Pustular psoriasis of palm of hand 02/11/2022       Allergies:Patient has no known allergies.    Current Outpatient Medications   Medication Sig Dispense Refill    Buprenorphine HCl-Naloxone HCl (SUBOXONE) 8-2 MG FILM Place 16 mg of opioid under the tongue every day for 30 days. 60 Each 0    clobetasol (TEMOVATE) 0.05 % Ointment       Apremilast (OTEZLA) 30 MG Tab Take 30 mg by mouth 2 times a day. 60 Tablet 3    hydrOXYzine HCl (ATARAX) 25 MG Tab Take 1 Tablet by mouth 3 times a day as needed for Itching. 30 Tablet 0    citalopram (CELEXA) 20 MG Tab Take 1 Tablet by mouth every day. For anxiety 90 Tablet 3    OTEZLA 10 & 20 & 30 MG Tablet Therapy Pack FOLLOW PACKAGE DIRECTIONS       No current facility-administered medications for this encounter.         I have discussed with the patient that with any controlled substance  prescription it is vital to have these medications in a safe, secure environment. I have discussed that it is their responsibility that their medications are not accessible to anyone else who may use them inadvertently.    I have discussed with the patient that any controlled substance can impair the ability to drive or operate any machinery and that the patient should not use them if they plan on driving or operating machinery.    I have reviewed and updated the past medical/surgical, family history and social history as of today.    ROS:  Review of Systems   Constitutional: Negative for fever, chills, weight loss and malaise/fatigue.   Respiratory: Negative for cough, sputum production, shortness of breath and wheezing.    Cardiovascular: Negative for chest pain, palpitations, orthopnea and leg swelling.   Gastrointestinal: Negative for heartburn, nausea, vomiting, constipation and abdominal pain.  Musculoskeletal:  neg  Skin: Negative for rash and itching.   Neurological: neg  Psychiatric/Behavioral: Negative for signs or symptoms of depression,, suicidal or homicidal ideation.  Patient able to contract for their safety.   All other systems reviewed and are negative except as in HPI.      Exam:  /78 (BP Location: Left arm, Patient Position: Sitting, BP Cuff Size: Adult)   Pulse 85   SpO2 97%   General:  male patient who appears in no distress        DISCUSSION OF CARE PLAN:    - I discussed management options. I reviewed symptomatic care     - I will obtain/review additional records if needed    - I discussed efforts with home exercise program and activity modification     - I reviewed medication monitoring.       The patient was advised to avoid alcohol use with prescribed medication. I counseled the patient regarding risks, side effects, and interactions of medications. I counseled the patient on the controlled substance prescribing program. I reviewed further symptomatic medications.  - I reviewed  additional diagnostic options: Yes.  - I reviewed additional therapeutic options: Yes  - I reviewed psychosocial interventions:  Yes      Assessment/Plan:  1. Uncomplicated opioid dependence (HCC)  Buprenorphine HCl-Naloxone HCl (SUBOXONE) 8-2 MG FILM          A Controlled Substance Agreement has been signed within the last year. A urine drug screen has been done in the past year.      The patient reports that their insomnia is well controlled with the current treatment plan  They were counseled on other means to help with sleep   This patient is continuing to use a controlled substance (CS) on a long term basis.  The patient is thoroughly aware of the goals of treatment with the CS  The patient is aware that yearly and random urine drug screens are required.  The patient has been instructed to take the CS only as prescribed.  The patient is prohibited from sharing the CS with any other person.  The patient is instructed to inform the provider if any other CS is taken, of any alcohol or cannabis or other recreational drug use, any treatment for side effects of the CS or complications, if they have CS active rx in other states  The patient has evidence for a reason for the CS  The treatment plan has been discussed with the patient  The  report has been reviewed      The patient reports that their current dosage of suboxone is controlling their cravings and has been able to refrain from any illicit opiate use. We will continue with current dose and f/u in 4 weeks for efficacy          Review of Systems:  Review of Systems   Constitutional: Negative.  Negative for chills and fever.   HENT: Negative.  Negative for hearing loss.    Eyes: Negative.  Negative for blurred vision and double vision.   Respiratory: Negative.  Negative for cough and hemoptysis.    Cardiovascular: Negative.  Negative for chest pain and palpitations.   Gastrointestinal: Negative.  Negative for heartburn and nausea.   Genitourinary: Negative.   Negative for dysuria.   Musculoskeletal: Negative.  Negative for myalgias.   Skin: Negative.  Negative for rash.   Neurological: Negative.  Negative for dizziness, tingling and headaches.   Endo/Heme/Allergies: Negative.  Does not bruise/bleed easily.   Psychiatric/Behavioral: Negative.  Negative for depression and suicidal ideas.    All other systems reviewed and are negative.    Physical Exam:  Physical Exam  Vitals and nursing note reviewed.   Constitutional:       General: He is not in acute distress.     Appearance: He is well-developed. He is not diaphoretic.   HENT:      Head: Normocephalic and atraumatic.      Mouth/Throat:      Pharynx: No oropharyngeal exudate.   Eyes:      Pupils: Pupils are equal, round, and reactive to light.   Cardiovascular:      Rate and Rhythm: Normal rate and regular rhythm.      Heart sounds: Normal heart sounds. No murmur heard.    No friction rub. No gallop.   Pulmonary:      Effort: Pulmonary effort is normal. No respiratory distress.      Breath sounds: Normal breath sounds. No wheezing or rales.   Chest:      Chest wall: No tenderness.   Neurological:      Mental Status: He is alert and oriented to person, place, and time.   Psychiatric:         Behavior: Behavior normal.         Thought Content: Thought content normal.         Judgment: Judgment normal.       Labs:  No results found for this or any previous visit (from the past 24 hour(s)).    Hemodynamics:  No data recorded.     Pulse  Av  Min: 85  Max: 85  Blood Pressure: 126/78     Respiratory:    Pulse Oximetry: 97 %           Fluids:  No intake or output data in the 24 hours ending 23 1539     GI/Nutrition:  No orders of the defined types were placed in this encounter.    Medications:  Current Outpatient Medications   Medication    Buprenorphine HCl-Naloxone HCl (SUBOXONE) 8-2 MG FILM    clobetasol (TEMOVATE) 0.05 % Ointment    Apremilast (OTEZLA) 30 MG Tab    hydrOXYzine HCl (ATARAX) 25 MG Tab    citalopram  (CELEXA) 20 MG Tab    OTEZLA 10 & 20 & 30 MG Tablet Therapy Pack     No current facility-administered medications for this encounter.     Medical Decision Making, by Problem:  There are no active hospital problems to display for this patient.    Plan:    1. Uncomplicated opioid dependence (HCC)  Buprenorphine HCl-Naloxone HCl (SUBOXONE) 8-2 MG FILM

## 2023-05-07 DIAGNOSIS — F11.20 UNCOMPLICATED OPIOID DEPENDENCE (HCC): ICD-10-CM

## 2023-05-08 RX ORDER — BUPRENORPHINE AND NALOXONE 8; 2 MG/1; MG/1
FILM, SOLUBLE BUCCAL; SUBLINGUAL
Qty: 60 EACH | Refills: 0 | Status: SHIPPED | OUTPATIENT
Start: 2023-05-08 | End: 2023-06-07

## 2023-06-07 DIAGNOSIS — F11.20 UNCOMPLICATED OPIOID DEPENDENCE (HCC): ICD-10-CM

## 2023-06-07 RX ORDER — BUPRENORPHINE AND NALOXONE 8; 2 MG/1; MG/1
FILM, SOLUBLE BUCCAL; SUBLINGUAL
Qty: 60 EACH | Refills: 0 | Status: SHIPPED | OUTPATIENT
Start: 2023-06-07 | End: 2023-06-28 | Stop reason: SDUPTHER

## 2023-06-07 NOTE — TELEPHONE ENCOUNTER
Received request via: Pharmacy    Was the patient seen in the last year in this department? Yes    Does the patient have an active prescription (recently filled or refills available) for medication(s) requested? No    Does the patient have [facility] Plus and need 100 day supply (blood pressure, diabetes and cholesterol meds only)? Medication is not for cholesterol, blood pressure or diabetes and Patient does not have SCP

## 2023-06-28 DIAGNOSIS — F11.20 UNCOMPLICATED OPIOID DEPENDENCE (HCC): ICD-10-CM

## 2023-06-28 NOTE — TELEPHONE ENCOUNTER
Received request via: Patient    Was the patient seen in the last year in this department? Yes    Does the patient have an active prescription (recently filled or refills available) for medication(s) requested? No    Does the patient have [facility] Plus and need 100 day supply (blood pressure, diabetes and cholesterol meds only)? Patient does not have SCP

## 2023-06-29 RX ORDER — BUPRENORPHINE AND NALOXONE 8; 2 MG/1; MG/1
FILM, SOLUBLE BUCCAL; SUBLINGUAL
Qty: 60 EACH | Refills: 0 | Status: SHIPPED | OUTPATIENT
Start: 2023-06-29 | End: 2023-08-04 | Stop reason: SDUPTHER

## 2023-07-13 ENCOUNTER — APPOINTMENT (OUTPATIENT)
Dept: BEHAVIORAL HEALTH | Facility: MEDICAL CENTER | Age: 43
End: 2023-07-13
Attending: FAMILY MEDICINE
Payer: COMMERCIAL

## 2023-07-27 ENCOUNTER — HOSPITAL ENCOUNTER (OUTPATIENT)
Dept: BEHAVIORAL HEALTH | Facility: MEDICAL CENTER | Age: 43
End: 2023-07-27
Attending: FAMILY MEDICINE
Payer: COMMERCIAL

## 2023-07-27 VITALS — DIASTOLIC BLOOD PRESSURE: 70 MMHG | HEART RATE: 77 BPM | SYSTOLIC BLOOD PRESSURE: 128 MMHG | OXYGEN SATURATION: 97 %

## 2023-07-27 DIAGNOSIS — F11.20 UNCOMPLICATED OPIOID DEPENDENCE (HCC): ICD-10-CM

## 2023-07-27 PROCEDURE — 99213 OFFICE O/P EST LOW 20 MIN: CPT | Performed by: FAMILY MEDICINE

## 2023-07-27 PROCEDURE — 99214 OFFICE O/P EST MOD 30 MIN: CPT | Performed by: FAMILY MEDICINE

## 2023-07-27 ASSESSMENT — ENCOUNTER SYMPTOMS
BRUISES/BLEEDS EASILY: 0
COUGH: 0
DEPRESSION: 0
HEADACHES: 0
NEUROLOGICAL NEGATIVE: 1
EYES NEGATIVE: 1
HEMOPTYSIS: 0
BLURRED VISION: 0
DIZZINESS: 0
TINGLING: 0
CHILLS: 0
CARDIOVASCULAR NEGATIVE: 1
GASTROINTESTINAL NEGATIVE: 1
MYALGIAS: 0
MUSCULOSKELETAL NEGATIVE: 1
HEARTBURN: 0
FEVER: 0
CONSTITUTIONAL NEGATIVE: 1
PALPITATIONS: 0
DOUBLE VISION: 0
PSYCHIATRIC NEGATIVE: 1
NAUSEA: 0
RESPIRATORY NEGATIVE: 1

## 2023-07-27 NOTE — PROGRESS NOTES
Psychiatric Progress Note               Author: Otto Haines M.D. Date & Time created: 7/27/2023  3:45 PM     Interval History:  No chief complaint on file.      HISTORY OF PRESENT ILLNESS: Patient is a 43 y.o. male established patient who presents today for a med refill of a controlled substance in addition to their other issues listed in the rest of the progress note    The patient is requesting medication for the following issue: refill of suboxone for opiate use disorder  Approximate date of onset of condition was years ago.    Current Problems:    Opiate use  disorder      Patient Active Problem List    Diagnosis Date Noted    Displaced fracture of shaft of left clavicle, initial encounter for closed fracture 02/13/2023    Positive self-administered antigen test for COVID-19 01/24/2023    Uncomplicated opioid dependence (HCC) 12/15/2022    Psoriasis 12/15/2022    High risk medication use 12/15/2022    Drug-induced immunodeficiency (HCC) 12/15/2022    Low libido 09/01/2022    Hair loss 02/11/2022    Other chest pain 02/11/2022    Anxiety 02/11/2022    Obesity (BMI 30-39.9) 02/11/2022    Pustular psoriasis of palm of hand 02/11/2022       Allergies:Patient has no known allergies.    Current Outpatient Medications   Medication Sig Dispense Refill    Buprenorphine HCl-Naloxone HCl 8-2 MG FILM DISSOLVE 2 FILM BY MOUTH EVERY DAY 60 Each 0    OTEZLA 10 & 20 & 30 MG Tablet Therapy Pack FOLLOW PACKAGE DIRECTIONS      clobetasol (TEMOVATE) 0.05 % Ointment       Apremilast (OTEZLA) 30 MG Tab Take 30 mg by mouth 2 times a day. 60 Tablet 3    hydrOXYzine HCl (ATARAX) 25 MG Tab Take 1 Tablet by mouth 3 times a day as needed for Itching. 30 Tablet 0    citalopram (CELEXA) 20 MG Tab Take 1 Tablet by mouth every day. For anxiety 90 Tablet 3     No current facility-administered medications for this encounter.         I have discussed with the patient that with any controlled substance prescription it is vital to have these  medications in a safe, secure environment. I have discussed that it is their responsibility that their medications are not accessible to anyone else who may use them inadvertently.    I have discussed with the patient that any controlled substance can impair the ability to drive or operate any machinery and that the patient should not use them if they plan on driving or operating machinery.    I have reviewed and updated the past medical/surgical, family history and social history as of today.    ROS:  Review of Systems   Constitutional: Negative for fever, chills, weight loss and malaise/fatigue.   Respiratory: Negative for cough, sputum production, shortness of breath and wheezing.    Cardiovascular: Negative for chest pain, palpitations, orthopnea and leg swelling.   Gastrointestinal: Negative for heartburn, nausea, vomiting, constipation and abdominal pain.  Musculoskeletal:  neg  Skin: Negative for rash and itching.   Neurological: neg  Psychiatric/Behavioral: Negative for signs or symptoms of depression,, suicidal or homicidal ideation.  Patient able to contract for their safety.   All other systems reviewed and are negative except as in HPI.      Exam:  /70 (BP Location: Left arm, Patient Position: Sitting, BP Cuff Size: Adult)   Pulse 77   SpO2 97%   General:  male patient who appears in no distress        DISCUSSION OF CARE PLAN:    - I discussed management options. I reviewed symptomatic care     - I will obtain/review additional records if needed    - I discussed efforts with home exercise program and activity modification     - I reviewed medication monitoring.       The patient was advised to avoid alcohol use with prescribed medication. I counseled the patient regarding risks, side effects, and interactions of medications. I counseled the patient on the controlled substance prescribing program. I reviewed further symptomatic medications.  - I reviewed additional diagnostic options: Yes.  - I  reviewed additional therapeutic options: Yes  - I reviewed psychosocial interventions:  Yes      Assessment/Plan:  1. Uncomplicated opioid dependence (HCC)            A Controlled Substance Agreement has been signed within the last year. A urine drug screen has been done in the past year.      The patient reports that their insomnia is well controlled with the current treatment plan  They were counseled on other means to help with sleep   This patient is continuing to use a controlled substance (CS) on a long term basis.  The patient is thoroughly aware of the goals of treatment with the CS  The patient is aware that yearly and random urine drug screens are required.  The patient has been instructed to take the CS only as prescribed.  The patient is prohibited from sharing the CS with any other person.  The patient is instructed to inform the provider if any other CS is taken, of any alcohol or cannabis or other recreational drug use, any treatment for side effects of the CS or complications, if they have CS active rx in other states  The patient has evidence for a reason for the CS  The treatment plan has been discussed with the patient  The  report has been reviewed      The patient reports that their current dosage of suboxone is controlling their cravings and has been able to refrain from any illicit opiate use. We will continue with current dose and f/u in 4 weeks for efficacy          Review of Systems:  Review of Systems   Constitutional: Negative.  Negative for chills and fever.   HENT: Negative.  Negative for hearing loss.    Eyes: Negative.  Negative for blurred vision and double vision.   Respiratory: Negative.  Negative for cough and hemoptysis.    Cardiovascular: Negative.  Negative for chest pain and palpitations.   Gastrointestinal: Negative.  Negative for heartburn and nausea.   Genitourinary: Negative.  Negative for dysuria.   Musculoskeletal: Negative.  Negative for myalgias.   Skin:  Positive  for itching and rash.   Neurological: Negative.  Negative for dizziness, tingling and headaches.   Endo/Heme/Allergies: Negative.  Does not bruise/bleed easily.   Psychiatric/Behavioral: Negative.  Negative for depression and suicidal ideas.    All other systems reviewed and are negative.      Physical Exam:  Physical Exam  Vitals and nursing note reviewed.   Constitutional:       General: He is not in acute distress.     Appearance: He is well-developed. He is not diaphoretic.   HENT:      Head: Normocephalic and atraumatic.      Mouth/Throat:      Pharynx: No oropharyngeal exudate.   Eyes:      Pupils: Pupils are equal, round, and reactive to light.   Cardiovascular:      Rate and Rhythm: Normal rate and regular rhythm.      Heart sounds: Normal heart sounds. No murmur heard.     No friction rub. No gallop.   Pulmonary:      Effort: Pulmonary effort is normal. No respiratory distress.      Breath sounds: Normal breath sounds. No wheezing or rales.   Chest:      Chest wall: No tenderness.   Skin:     Findings: Rash present. Rash is macular.   Neurological:      Mental Status: He is alert and oriented to person, place, and time.   Psychiatric:         Behavior: Behavior normal.         Thought Content: Thought content normal.         Judgment: Judgment normal.         Labs:  No results found for this or any previous visit (from the past 24 hour(s)).    Hemodynamics:  No data recorded.     Pulse  Av  Min: 77  Max: 77  Blood Pressure: 128/70     Respiratory:    Pulse Oximetry: 97 %           Fluids:  No intake or output data in the 24 hours ending 23 1545     GI/Nutrition:  No orders of the defined types were placed in this encounter.    Medications:  Current Outpatient Medications   Medication    Buprenorphine HCl-Naloxone HCl 8-2 MG FILM    OTEZLA 10 & 20 & 30 MG Tablet Therapy Pack    clobetasol (TEMOVATE) 0.05 % Ointment    Apremilast (OTEZLA) 30 MG Tab    hydrOXYzine HCl (ATARAX) 25 MG Tab     citalopram (CELEXA) 20 MG Tab     No current facility-administered medications for this encounter.     Medical Decision Making, by Problem:  There are no active hospital problems to display for this patient.    Plan:  1. Uncomplicated opioid dependence (HCC)

## 2023-08-03 DIAGNOSIS — F11.20 UNCOMPLICATED OPIOID DEPENDENCE (HCC): ICD-10-CM

## 2023-08-03 RX ORDER — BUPRENORPHINE AND NALOXONE 8; 2 MG/1; MG/1
FILM, SOLUBLE BUCCAL; SUBLINGUAL
Qty: 60 EACH | Refills: 0 | Status: CANCELLED | OUTPATIENT
Start: 2023-08-03 | End: 2023-09-02

## 2023-08-04 DIAGNOSIS — F11.20 UNCOMPLICATED OPIOID DEPENDENCE (HCC): ICD-10-CM

## 2023-08-04 RX ORDER — BUPRENORPHINE AND NALOXONE 8; 2 MG/1; MG/1
FILM, SOLUBLE BUCCAL; SUBLINGUAL
Qty: 60 EACH | Refills: 0 | Status: CANCELLED | OUTPATIENT
Start: 2023-08-04 | End: 2023-09-03

## 2023-08-04 RX ORDER — BUPRENORPHINE AND NALOXONE 8; 2 MG/1; MG/1
FILM, SOLUBLE BUCCAL; SUBLINGUAL
Qty: 60 EACH | Refills: 0 | Status: SHIPPED | OUTPATIENT
Start: 2023-08-04 | End: 2023-08-29 | Stop reason: SDUPTHER

## 2023-08-04 NOTE — TELEPHONE ENCOUNTER
Received request via: Patient    Was the patient seen in the last year in this department? Yes    Does the patient have an active prescription (recently filled or refills available) for medication(s) requested? No,     Does the patient have [facility] Plus and need 100 day supply (blood pressure, diabetes and cholesterol meds only)? Patient does not have SCP

## 2023-08-29 ENCOUNTER — PATIENT MESSAGE (OUTPATIENT)
Dept: BEHAVIORAL HEALTH | Facility: MEDICAL CENTER | Age: 43
End: 2023-08-29
Payer: COMMERCIAL

## 2023-08-29 DIAGNOSIS — F11.20 UNCOMPLICATED OPIOID DEPENDENCE (HCC): ICD-10-CM

## 2023-08-29 NOTE — PATIENT COMMUNICATION
Received request via: Patient    Was the patient seen in the last year in this department? Yes    Does the patient have an active prescription (recently filled or refills available) for medication(s) requested? No    Does the patient have [facility] Plus and need 100 day supply (blood pressure, diabetes and cholesterol meds only)? Medication is not for cholesterol, blood pressure or diabetes

## 2023-08-30 RX ORDER — BUPRENORPHINE AND NALOXONE 8; 2 MG/1; MG/1
FILM, SOLUBLE BUCCAL; SUBLINGUAL
Qty: 60 EACH | Refills: 0 | Status: SHIPPED | OUTPATIENT
Start: 2023-08-30 | End: 2023-09-26

## 2023-09-13 DIAGNOSIS — L40.9 PSORIASIS: ICD-10-CM

## 2023-09-13 RX ORDER — APREMILAST 30 MG/1
30 TABLET, FILM COATED ORAL
Qty: 60 TABLET | Refills: 3 | Status: SHIPPED | OUTPATIENT
Start: 2023-09-13

## 2023-09-24 DIAGNOSIS — F11.20 UNCOMPLICATED OPIOID DEPENDENCE (HCC): ICD-10-CM

## 2023-09-26 RX ORDER — BUPRENORPHINE AND NALOXONE 8; 2 MG/1; MG/1
FILM, SOLUBLE BUCCAL; SUBLINGUAL
Qty: 30 EACH | Refills: 0 | Status: SHIPPED | OUTPATIENT
Start: 2023-09-26 | End: 2023-10-02 | Stop reason: SDUPTHER

## 2023-10-02 DIAGNOSIS — F11.20 UNCOMPLICATED OPIOID DEPENDENCE (HCC): ICD-10-CM

## 2023-10-02 RX ORDER — BUPRENORPHINE AND NALOXONE 8; 2 MG/1; MG/1
FILM, SOLUBLE BUCCAL; SUBLINGUAL
Qty: 60 EACH | Refills: 0 | Status: SHIPPED | OUTPATIENT
Start: 2023-10-02 | End: 2023-11-13

## 2023-10-11 RX ORDER — CITALOPRAM 20 MG/1
20 TABLET ORAL DAILY
Qty: 90 TABLET | Refills: 3 | Status: SHIPPED | OUTPATIENT
Start: 2023-10-11

## 2023-10-11 NOTE — TELEPHONE ENCOUNTER
Received request via: Patient    Was the patient seen in the last year in this department? Yes    Does the patient have an active prescription (recently filled or refills available) for medication(s) requested? No    Does the patient have [facility] and need 100 day supply (blood pressure, diabetes and cholesterol meds only)? Patient does not have [facility]    Last ov  [id number]

## 2023-10-26 ENCOUNTER — HOSPITAL ENCOUNTER (OUTPATIENT)
Dept: BEHAVIORAL HEALTH | Facility: MEDICAL CENTER | Age: 43
End: 2023-10-26
Attending: FAMILY MEDICINE
Payer: COMMERCIAL

## 2023-10-26 VITALS — SYSTOLIC BLOOD PRESSURE: 138 MMHG | DIASTOLIC BLOOD PRESSURE: 90 MMHG | HEART RATE: 82 BPM | OXYGEN SATURATION: 96 %

## 2023-10-26 DIAGNOSIS — F11.20 UNCOMPLICATED OPIOID DEPENDENCE (HCC): ICD-10-CM

## 2023-10-26 PROCEDURE — 99214 OFFICE O/P EST MOD 30 MIN: CPT | Performed by: FAMILY MEDICINE

## 2023-10-26 PROCEDURE — 99213 OFFICE O/P EST LOW 20 MIN: CPT | Performed by: FAMILY MEDICINE

## 2023-10-26 ASSESSMENT — ENCOUNTER SYMPTOMS
CONSTITUTIONAL NEGATIVE: 1
BRUISES/BLEEDS EASILY: 0
CHILLS: 0
BLURRED VISION: 0
DIZZINESS: 0
NAUSEA: 0
COUGH: 0
MUSCULOSKELETAL NEGATIVE: 1
HEADACHES: 0
DOUBLE VISION: 0
MYALGIAS: 0
TINGLING: 0
HEARTBURN: 0
GASTROINTESTINAL NEGATIVE: 1
PALPITATIONS: 0
NEUROLOGICAL NEGATIVE: 1
EYES NEGATIVE: 1
HEMOPTYSIS: 0
CARDIOVASCULAR NEGATIVE: 1
FEVER: 0
DEPRESSION: 0
PSYCHIATRIC NEGATIVE: 1
RESPIRATORY NEGATIVE: 1

## 2023-10-26 NOTE — PROGRESS NOTES
Psychiatric Progress Note               Author: Otto Haines M.D. Date & Time created: 10/26/2023  3:32 PM     Interval History:  No chief complaint on file.      HISTORY OF PRESENT ILLNESS: Patient is a 43 y.o. male established patient who presents today for a med refill of a controlled substance in addition to their other issues listed in the rest of the progress note    The patient is requesting medication for the following issue: refill of suboxone for opiate use disorder  Approximate date of onset of condition was years ago.    Current Problems:    Opiate use  disorder      Patient Active Problem List    Diagnosis Date Noted    Displaced fracture of shaft of left clavicle, initial encounter for closed fracture 02/13/2023    Positive self-administered antigen test for COVID-19 01/24/2023    Uncomplicated opioid dependence (HCC) 12/15/2022    Psoriasis 12/15/2022    High risk medication use 12/15/2022    Drug-induced immunodeficiency (HCC) 12/15/2022    Low libido 09/01/2022    Hair loss 02/11/2022    Other chest pain 02/11/2022    Anxiety 02/11/2022    Obesity (BMI 30-39.9) 02/11/2022    Pustular psoriasis of palm of hand 02/11/2022       Allergies:Patient has no known allergies.    Current Outpatient Medications   Medication Sig Dispense Refill    citalopram (CELEXA) 20 MG Tab Take 1 Tablet by mouth every day. For anxiety 90 Tablet 3    Buprenorphine HCl-Naloxone HCl 8-2 MG FILM DISSOLVE 2 FILMS ON THE TONGUE DAILY 60 Each 0    Apremilast (OTEZLA) 30 MG Tab Take 30 mg by mouth 2 times a day. 60 Tablet 3    clobetasol (TEMOVATE) 0.05 % Ointment       hydrOXYzine HCl (ATARAX) 25 MG Tab Take 1 Tablet by mouth 3 times a day as needed for Itching. 30 Tablet 0    OTEZLA 10 & 20 & 30 MG Tablet Therapy Pack FOLLOW PACKAGE DIRECTIONS (Patient not taking: Reported on 10/26/2023)       No current facility-administered medications for this encounter.         I have discussed with the patient that with any controlled  substance prescription it is vital to have these medications in a safe, secure environment. I have discussed that it is their responsibility that their medications are not accessible to anyone else who may use them inadvertently.    I have discussed with the patient that any controlled substance can impair the ability to drive or operate any machinery and that the patient should not use them if they plan on driving or operating machinery.    I have reviewed and updated the past medical/surgical, family history and social history as of today.    ROS:  Review of Systems   Constitutional: Negative for fever, chills, weight loss and malaise/fatigue.   Respiratory: Negative for cough, sputum production, shortness of breath and wheezing.    Cardiovascular: Negative for chest pain, palpitations, orthopnea and leg swelling.   Gastrointestinal: Negative for heartburn, nausea, vomiting, constipation and abdominal pain.  Musculoskeletal:  neg  Skin: Negative for rash and itching.   Neurological: neg  Psychiatric/Behavioral: Negative for signs or symptoms of depression,, suicidal or homicidal ideation.  Patient able to contract for their safety.   All other systems reviewed and are negative except as in HPI.      Exam:  BP (!) 138/90 (BP Location: Left arm, Patient Position: Sitting, BP Cuff Size: Adult)   Pulse 82   SpO2 96%   General:  male patient who appears in no distress        DISCUSSION OF CARE PLAN:    - I discussed management options. I reviewed symptomatic care     - I will obtain/review additional records if needed    - I discussed efforts with home exercise program and activity modification     - I reviewed medication monitoring.       The patient was advised to avoid alcohol use with prescribed medication. I counseled the patient regarding risks, side effects, and interactions of medications. I counseled the patient on the controlled substance prescribing program. I reviewed further symptomatic medications.  - I  reviewed additional diagnostic options: Yes.  - I reviewed additional therapeutic options: Yes  - I reviewed psychosocial interventions:  Yes      Assessment/Plan:  1. Uncomplicated opioid dependence (HCC)            A Controlled Substance Agreement has been signed within the last year. A urine drug screen has been done in the past year.      The patient reports that their insomnia is well controlled with the current treatment plan  They were counseled on other means to help with sleep   This patient is continuing to use a controlled substance (CS) on a long term basis.  The patient is thoroughly aware of the goals of treatment with the CS  The patient is aware that yearly and random urine drug screens are required.  The patient has been instructed to take the CS only as prescribed.  The patient is prohibited from sharing the CS with any other person.  The patient is instructed to inform the provider if any other CS is taken, of any alcohol or cannabis or other recreational drug use, any treatment for side effects of the CS or complications, if they have CS active rx in other states  The patient has evidence for a reason for the CS  The treatment plan has been discussed with the patient  The  report has been reviewed      The patient reports that their current dosage of suboxone is controlling their cravings and has been able to refrain from any illicit opiate use. We will continue with current dose and f/u in 4 weeks for efficacy          Review of Systems:  Review of Systems   Constitutional: Negative.  Negative for chills and fever.   HENT: Negative.  Negative for hearing loss.    Eyes: Negative.  Negative for blurred vision and double vision.   Respiratory: Negative.  Negative for cough and hemoptysis.    Cardiovascular: Negative.  Negative for chest pain and palpitations.   Gastrointestinal: Negative.  Negative for heartburn and nausea.   Genitourinary: Negative.  Negative for dysuria.   Musculoskeletal:  Negative.  Negative for myalgias.   Skin: Negative.  Negative for rash.   Neurological: Negative.  Negative for dizziness, tingling and headaches.   Endo/Heme/Allergies: Negative.  Does not bruise/bleed easily.   Psychiatric/Behavioral: Negative.  Negative for depression and suicidal ideas.    All other systems reviewed and are negative.      Physical Exam:  Physical Exam  Vitals and nursing note reviewed.   Constitutional:       General: He is not in acute distress.     Appearance: He is well-developed. He is not diaphoretic.   HENT:      Head: Normocephalic and atraumatic.      Mouth/Throat:      Pharynx: No oropharyngeal exudate.   Eyes:      Pupils: Pupils are equal, round, and reactive to light.   Cardiovascular:      Rate and Rhythm: Normal rate and regular rhythm.      Heart sounds: Normal heart sounds. No murmur heard.     No friction rub. No gallop.   Pulmonary:      Effort: Pulmonary effort is normal. No respiratory distress.      Breath sounds: Normal breath sounds. No wheezing or rales.   Chest:      Chest wall: No tenderness.   Neurological:      Mental Status: He is alert and oriented to person, place, and time.   Psychiatric:         Behavior: Behavior normal.         Thought Content: Thought content normal.         Judgment: Judgment normal.         Labs:  No results found for this or any previous visit (from the past 24 hour(s)).    Hemodynamics:  No data recorded.     Pulse  Av  Min: 82  Max: 82  Blood Pressure: (!) 138/90 (No sx)     Respiratory:    Pulse Oximetry: 96 %           Fluids:  No intake or output data in the 24 hours ending 10/26/23 1532     GI/Nutrition:  No orders of the defined types were placed in this encounter.    Medications:  Current Outpatient Medications   Medication    citalopram (CELEXA) 20 MG Tab    Buprenorphine HCl-Naloxone HCl 8-2 MG FILM    Apremilast (OTEZLA) 30 MG Tab    clobetasol (TEMOVATE) 0.05 % Ointment    hydrOXYzine HCl (ATARAX) 25 MG Tab    OTEZLA 10 &  20 & 30 MG Tablet Therapy Pack     No current facility-administered medications for this encounter.     Medical Decision Making, by Problem:  There are no active hospital problems to display for this patient.    Plan:  1. Uncomplicated opioid dependence (HCC)

## 2023-11-13 DIAGNOSIS — F11.20 UNCOMPLICATED OPIOID DEPENDENCE (HCC): ICD-10-CM

## 2023-11-13 RX ORDER — BUPRENORPHINE AND NALOXONE 8; 2 MG/1; MG/1
FILM, SOLUBLE BUCCAL; SUBLINGUAL
Qty: 60 EACH | Refills: 0 | Status: SHIPPED | OUTPATIENT
Start: 2023-11-13 | End: 2023-12-07

## 2023-12-07 DIAGNOSIS — F11.20 UNCOMPLICATED OPIOID DEPENDENCE (HCC): ICD-10-CM

## 2023-12-07 RX ORDER — BUPRENORPHINE AND NALOXONE 8; 2 MG/1; MG/1
FILM, SOLUBLE BUCCAL; SUBLINGUAL
Qty: 60 EACH | Refills: 0 | Status: SHIPPED | OUTPATIENT
Start: 2023-12-07 | End: 2024-01-08

## 2024-01-08 DIAGNOSIS — F11.20 UNCOMPLICATED OPIOID DEPENDENCE (HCC): ICD-10-CM

## 2024-01-08 RX ORDER — BUPRENORPHINE AND NALOXONE 8; 2 MG/1; MG/1
FILM, SOLUBLE BUCCAL; SUBLINGUAL
Qty: 60 EACH | Refills: 0 | Status: SHIPPED | OUTPATIENT
Start: 2024-01-08 | End: 2024-02-08

## 2024-01-08 NOTE — TELEPHONE ENCOUNTER
Received request via: Pharmacy    Was the patient seen in the last year in this department? Yes    Does the patient have an active prescription (recently filled or refills available) for medication(s) requested? No    Does the patient have [facility] Plus and need 100 day supply (blood pressure, diabetes and cholesterol meds only)? Medication is not for cholesterol, blood pressure or diabetes and Patient does not have SCP

## 2024-01-31 RX ORDER — APREMILAST 10-20-30MG
KIT ORAL
OUTPATIENT
Start: 2024-01-31

## 2024-01-31 NOTE — TELEPHONE ENCOUNTER
Received request via: Pharmacy    Was the patient seen in the last year in this department? Yes    Does the patient have an active prescription (recently filled or refills available) for medication(s) requested? No    [facility]     Does the patient have [facility] Plus and need 100 day supply (blood pressure, diabetes and cholesterol meds only)? Patient does not have SCP

## 2024-02-07 ENCOUNTER — APPOINTMENT (RX ONLY)
Dept: URBAN - NONMETROPOLITAN AREA CLINIC 15 | Facility: CLINIC | Age: 44
Setting detail: DERMATOLOGY
End: 2024-02-07

## 2024-02-07 DIAGNOSIS — F11.20 UNCOMPLICATED OPIOID DEPENDENCE (HCC): ICD-10-CM

## 2024-02-07 DIAGNOSIS — L40.0 PSORIASIS VULGARIS: ICD-10-CM

## 2024-02-07 DIAGNOSIS — Z79.899 OTHER LONG TERM (CURRENT) DRUG THERAPY: ICD-10-CM

## 2024-02-07 PROCEDURE — ? PHOTO-DOCUMENTATION

## 2024-02-07 PROCEDURE — 99203 OFFICE O/P NEW LOW 30 MIN: CPT

## 2024-02-07 PROCEDURE — ? COUNSELING

## 2024-02-07 PROCEDURE — ? ORDER TESTS

## 2024-02-07 ASSESSMENT — LOCATION ZONE DERM
LOCATION ZONE: HAND
LOCATION ZONE: ARM
LOCATION ZONE: LEG
LOCATION ZONE: FEET

## 2024-02-07 ASSESSMENT — LOCATION DETAILED DESCRIPTION DERM
LOCATION DETAILED: LEFT MEDIAL HEEL
LOCATION DETAILED: LEFT VENTRAL PROXIMAL FOREARM
LOCATION DETAILED: RIGHT ULNAR PALM
LOCATION DETAILED: RIGHT VENTRAL PROXIMAL FOREARM
LOCATION DETAILED: RIGHT MEDIAL DORSAL FOOT
LOCATION DETAILED: LEFT THENAR EMINENCE
LOCATION DETAILED: LEFT PROXIMAL PRETIBIAL REGION

## 2024-02-07 ASSESSMENT — LOCATION SIMPLE DESCRIPTION DERM
LOCATION SIMPLE: LEFT HAND
LOCATION SIMPLE: RIGHT FOOT
LOCATION SIMPLE: LEFT FOREARM
LOCATION SIMPLE: LEFT FOOT
LOCATION SIMPLE: RIGHT HAND
LOCATION SIMPLE: RIGHT FOREARM
LOCATION SIMPLE: LEFT PRETIBIAL REGION

## 2024-02-07 NOTE — PROCEDURE: COUNSELING
Patient Specific Counseling (Will Not Stick From Patient To Patient): Tremfya booklet given.
Detail Level: Zone

## 2024-02-07 NOTE — PROCEDURE: ORDER TESTS
Billing Type: Third-Party Bill
Performing Laboratory: 0
Bill For Surgical Tray: no
Expected Date Of Service: 02/07/2024

## 2024-02-08 RX ORDER — BUPRENORPHINE AND NALOXONE 8; 2 MG/1; MG/1
FILM, SOLUBLE BUCCAL; SUBLINGUAL
Qty: 60 EACH | Refills: 0 | Status: SHIPPED | OUTPATIENT
Start: 2024-02-08 | End: 2024-03-04

## 2024-02-08 NOTE — TELEPHONE ENCOUNTER
Received request via: Pharmacy    Was the patient seen in the last year in this department? Yes    Does the patient have an active prescription (recently filled or refills available) for medication(s) requested? No    Pharm[facility] Name[facility]    Does the patient have [facility] Plus and need 100 day supply (blood pressure, diabetes and cholesterol meds only)? Medication is not for cholesterol, blood pressure or diabetes and Patient does not have SCP

## 2024-02-09 ENCOUNTER — HOSPITAL ENCOUNTER (OUTPATIENT)
Dept: LAB | Facility: MEDICAL CENTER | Age: 44
End: 2024-02-09
Attending: DERMATOLOGY
Payer: COMMERCIAL

## 2024-02-09 LAB
BASOPHILS # BLD AUTO: 0.7 % (ref 0–1.8)
BASOPHILS # BLD: 0.05 K/UL (ref 0–0.12)
EOSINOPHIL # BLD AUTO: 0.11 K/UL (ref 0–0.51)
EOSINOPHIL NFR BLD: 1.6 % (ref 0–6.9)
ERYTHROCYTE [DISTWIDTH] IN BLOOD BY AUTOMATED COUNT: 40.8 FL (ref 35.9–50)
HCT VFR BLD AUTO: 44.4 % (ref 42–52)
HGB BLD-MCNC: 15.5 G/DL (ref 14–18)
IMM GRANULOCYTES # BLD AUTO: 0.01 K/UL (ref 0–0.11)
IMM GRANULOCYTES NFR BLD AUTO: 0.1 % (ref 0–0.9)
LYMPHOCYTES # BLD AUTO: 2.79 K/UL (ref 1–4.8)
LYMPHOCYTES NFR BLD: 40.9 % (ref 22–41)
MCH RBC QN AUTO: 29.4 PG (ref 27–33)
MCHC RBC AUTO-ENTMCNC: 34.9 G/DL (ref 32.3–36.5)
MCV RBC AUTO: 84.1 FL (ref 81.4–97.8)
MONOCYTES # BLD AUTO: 0.59 K/UL (ref 0–0.85)
MONOCYTES NFR BLD AUTO: 8.7 % (ref 0–13.4)
NEUTROPHILS # BLD AUTO: 3.27 K/UL (ref 1.82–7.42)
NEUTROPHILS NFR BLD: 48 % (ref 44–72)
NRBC # BLD AUTO: 0 K/UL
NRBC BLD-RTO: 0 /100 WBC (ref 0–0.2)
PLATELET # BLD AUTO: 231 K/UL (ref 164–446)
PMV BLD AUTO: 12 FL (ref 9–12.9)
RBC # BLD AUTO: 5.28 M/UL (ref 4.7–6.1)
WBC # BLD AUTO: 6.8 K/UL (ref 4.8–10.8)

## 2024-02-09 PROCEDURE — 85025 COMPLETE CBC W/AUTO DIFF WBC: CPT

## 2024-02-09 PROCEDURE — 80048 BASIC METABOLIC PNL TOTAL CA: CPT

## 2024-02-09 PROCEDURE — 86704 HEP B CORE ANTIBODY TOTAL: CPT

## 2024-02-09 PROCEDURE — 80061 LIPID PANEL: CPT

## 2024-02-09 PROCEDURE — 36415 COLL VENOUS BLD VENIPUNCTURE: CPT

## 2024-02-09 PROCEDURE — 87340 HEPATITIS B SURFACE AG IA: CPT

## 2024-02-09 PROCEDURE — 86480 TB TEST CELL IMMUN MEASURE: CPT

## 2024-02-09 PROCEDURE — 86706 HEP B SURFACE ANTIBODY: CPT

## 2024-02-09 PROCEDURE — 86803 HEPATITIS C AB TEST: CPT

## 2024-02-09 PROCEDURE — 80076 HEPATIC FUNCTION PANEL: CPT

## 2024-02-10 LAB
ALBUMIN SERPL BCP-MCNC: 4.3 G/DL (ref 3.2–4.9)
ALP SERPL-CCNC: 95 U/L (ref 30–99)
ALT SERPL-CCNC: 66 U/L (ref 2–50)
ANION GAP SERPL CALC-SCNC: 13 MMOL/L (ref 7–16)
AST SERPL-CCNC: 40 U/L (ref 12–45)
BILIRUB CONJ SERPL-MCNC: <0.2 MG/DL (ref 0.1–0.5)
BILIRUB INDIRECT SERPL-MCNC: ABNORMAL MG/DL (ref 0–1)
BILIRUB SERPL-MCNC: 0.3 MG/DL (ref 0.1–1.5)
BUN SERPL-MCNC: 11 MG/DL (ref 8–22)
CALCIUM SERPL-MCNC: 9.4 MG/DL (ref 8.5–10.5)
CHLORIDE SERPL-SCNC: 104 MMOL/L (ref 96–112)
CHOLEST SERPL-MCNC: 167 MG/DL (ref 100–199)
CO2 SERPL-SCNC: 23 MMOL/L (ref 20–33)
CREAT SERPL-MCNC: 0.65 MG/DL (ref 0.5–1.4)
FASTING STATUS PATIENT QL REPORTED: NORMAL
GFR SERPLBLD CREATININE-BSD FMLA CKD-EPI: 120 ML/MIN/1.73 M 2
GLUCOSE SERPL-MCNC: 103 MG/DL (ref 65–99)
HBV CORE AB SERPL QL IA: NONREACTIVE
HBV SURFACE AB SERPL IA-ACNC: <3.5 MIU/ML (ref 0–10)
HBV SURFACE AG SER QL: NORMAL
HCV AB SER QL: NORMAL
HDLC SERPL-MCNC: 41 MG/DL
LDLC SERPL CALC-MCNC: 81 MG/DL
POTASSIUM SERPL-SCNC: 3.9 MMOL/L (ref 3.6–5.5)
PROT SERPL-MCNC: 7.5 G/DL (ref 6–8.2)
SODIUM SERPL-SCNC: 140 MMOL/L (ref 135–145)
TRIGL SERPL-MCNC: 225 MG/DL (ref 0–149)

## 2024-02-12 LAB
GAMMA INTERFERON BACKGROUND BLD IA-ACNC: 0.04 IU/ML
M TB IFN-G BLD-IMP: NEGATIVE
M TB IFN-G CD4+ BCKGRND COR BLD-ACNC: -0.01 IU/ML
MITOGEN IGNF BCKGRD COR BLD-ACNC: >10 IU/ML
QFT TB2 - NIL TBQ2: 0 IU/ML

## 2024-02-15 ENCOUNTER — APPOINTMENT (OUTPATIENT)
Dept: BEHAVIORAL HEALTH | Facility: MEDICAL CENTER | Age: 44
End: 2024-02-15
Attending: FAMILY MEDICINE
Payer: COMMERCIAL

## 2024-02-16 ENCOUNTER — RX ONLY (OUTPATIENT)
Age: 44
Setting detail: RX ONLY
End: 2024-02-16

## 2024-02-16 RX ORDER — GUSELKUMAB 100 MG/ML
INJECTION SUBCUTANEOUS
Qty: 2 | Refills: 3 | Status: ERX | COMMUNITY
Start: 2024-02-16

## 2024-02-20 ENCOUNTER — DOCUMENTATION (OUTPATIENT)
Dept: BEHAVIORAL HEALTH | Facility: MEDICAL CENTER | Age: 44
End: 2024-02-20
Payer: COMMERCIAL

## 2024-02-20 ENCOUNTER — HOSPITAL ENCOUNTER (OUTPATIENT)
Dept: BEHAVIORAL HEALTH | Facility: MEDICAL CENTER | Age: 44
End: 2024-02-20
Attending: FAMILY MEDICINE
Payer: COMMERCIAL

## 2024-02-20 VITALS
OXYGEN SATURATION: 97 % | HEART RATE: 69 BPM | WEIGHT: 207.4 LBS | HEIGHT: 69 IN | DIASTOLIC BLOOD PRESSURE: 82 MMHG | SYSTOLIC BLOOD PRESSURE: 128 MMHG | BODY MASS INDEX: 30.72 KG/M2

## 2024-02-20 DIAGNOSIS — F11.20 UNCOMPLICATED OPIOID DEPENDENCE (HCC): ICD-10-CM

## 2024-02-20 PROCEDURE — 99214 OFFICE O/P EST MOD 30 MIN: CPT | Performed by: FAMILY MEDICINE

## 2024-02-20 ASSESSMENT — ANXIETY QUESTIONNAIRES
4. TROUBLE RELAXING: SEVERAL DAYS
IF YOU CHECKED OFF ANY PROBLEMS ON THIS QUESTIONNAIRE, HOW DIFFICULT HAVE THESE PROBLEMS MADE IT FOR YOU TO DO YOUR WORK, TAKE CARE OF THINGS AT HOME, OR GET ALONG WITH OTHER PEOPLE: SOMEWHAT DIFFICULT
6. BECOMING EASILY ANNOYED OR IRRITABLE: SEVERAL DAYS
1. FEELING NERVOUS, ANXIOUS, OR ON EDGE: SEVERAL DAYS
5. BEING SO RESTLESS THAT IT IS HARD TO SIT STILL: SEVERAL DAYS
2. NOT BEING ABLE TO STOP OR CONTROL WORRYING: SEVERAL DAYS
7. FEELING AFRAID AS IF SOMETHING AWFUL MIGHT HAPPEN: NOT AT ALL
GAD7 TOTAL SCORE: 6
3. WORRYING TOO MUCH ABOUT DIFFERENT THINGS: SEVERAL DAYS

## 2024-02-20 ASSESSMENT — ENCOUNTER SYMPTOMS
MUSCULOSKELETAL NEGATIVE: 1
NEUROLOGICAL NEGATIVE: 1
MYALGIAS: 0
CHILLS: 0
CONSTITUTIONAL NEGATIVE: 1
BRUISES/BLEEDS EASILY: 0
RESPIRATORY NEGATIVE: 1
PSYCHIATRIC NEGATIVE: 1
GASTROINTESTINAL NEGATIVE: 1
HEARTBURN: 0
DEPRESSION: 0
DIZZINESS: 0
HEMOPTYSIS: 0
HEADACHES: 0
DOUBLE VISION: 0
CARDIOVASCULAR NEGATIVE: 1
BLURRED VISION: 0
FEVER: 0
COUGH: 0
EYES NEGATIVE: 1
TINGLING: 0
PALPITATIONS: 0
NAUSEA: 0

## 2024-02-20 ASSESSMENT — PATIENT HEALTH QUESTIONNAIRE - PHQ9
5. POOR APPETITE OR OVEREATING: 0 - NOT AT ALL
SUM OF ALL RESPONSES TO PHQ QUESTIONS 1-9: 2
CLINICAL INTERPRETATION OF PHQ2 SCORE: 1

## 2024-02-20 ASSESSMENT — FIBROSIS 4 INDEX: FIB4 SCORE: 0.9

## 2024-02-20 NOTE — PROGRESS NOTES
Psychiatric Progress Note               Author: Otto Haines M.D. Date & Time created: 2/20/2024  3:25 PM     Interval History:  No chief complaint on file.      HISTORY OF PRESENT ILLNESS: Patient is a 43 y.o. male established patient who presents today for a med refill of a controlled substance in addition to their other issues listed in the rest of the progress note    The patient is requesting medication for the following issue: refill of suboxone for opiate use disorder  Approximate date of onset of condition was years ago.    Current Problems:    Opiate use  disorder      Patient Active Problem List    Diagnosis Date Noted    Displaced fracture of shaft of left clavicle, initial encounter for closed fracture 02/13/2023    Positive self-administered antigen test for COVID-19 01/24/2023    Uncomplicated opioid dependence (HCC) 12/15/2022    Psoriasis 12/15/2022    High risk medication use 12/15/2022    Drug-induced immunodeficiency (HCC) 12/15/2022    Low libido 09/01/2022    Hair loss 02/11/2022    Other chest pain 02/11/2022    Anxiety 02/11/2022    Obesity (BMI 30-39.9) 02/11/2022    Pustular psoriasis of palm of hand 02/11/2022       Allergies:Patient has no known allergies.    Current Outpatient Medications   Medication Sig Dispense Refill    Buprenorphine HCl-Naloxone HCl 8-2 MG FILM DISSOVLE 2 FILMS ON THE TONGUE DAILY 60 Each 0    citalopram (CELEXA) 20 MG Tab Take 1 Tablet by mouth every day. For anxiety 90 Tablet 3    Apremilast (OTEZLA) 30 MG Tab Take 30 mg by mouth 2 times a day. 60 Tablet 3    OTEZLA 10 & 20 & 30 MG Tablet Therapy Pack FOLLOW PACKAGE DIRECTIONS (Patient not taking: Reported on 10/26/2023)      clobetasol (TEMOVATE) 0.05 % Ointment       hydrOXYzine HCl (ATARAX) 25 MG Tab Take 1 Tablet by mouth 3 times a day as needed for Itching. 30 Tablet 0     No current facility-administered medications for this encounter.         I have discussed with the patient that with any controlled  "substance prescription it is vital to have these medications in a safe, secure environment. I have discussed that it is their responsibility that their medications are not accessible to anyone else who may use them inadvertently.    I have discussed with the patient that any controlled substance can impair the ability to drive or operate any machinery and that the patient should not use them if they plan on driving or operating machinery.    I have reviewed and updated the past medical/surgical, family history and social history as of today.    ROS:  Review of Systems   Constitutional: Negative for fever, chills, weight loss and malaise/fatigue.   Respiratory: Negative for cough, sputum production, shortness of breath and wheezing.    Cardiovascular: Negative for chest pain, palpitations, orthopnea and leg swelling.   Gastrointestinal: Negative for heartburn, nausea, vomiting, constipation and abdominal pain.  Musculoskeletal:  neg  Skin: Negative for rash and itching.   Neurological: neg  Psychiatric/Behavioral: Negative for signs or symptoms of depression,, suicidal or homicidal ideation.  Patient able to contract for their safety.   All other systems reviewed and are negative except as in HPI.      Exam:  /82 (BP Location: Left arm, Patient Position: Sitting)   Pulse 69   Ht 1.753 m (5' 9\")   Wt 94.1 kg (207 lb 6.4 oz)   SpO2 97%   General:  male patient who appears in no distress        DISCUSSION OF CARE PLAN:    - I discussed management options. I reviewed symptomatic care     - I will obtain/review additional records if needed    - I discussed efforts with home exercise program and activity modification     - I reviewed medication monitoring.       The patient was advised to avoid alcohol use with prescribed medication. I counseled the patient regarding risks, side effects, and interactions of medications. I counseled the patient on the controlled substance prescribing program. I reviewed further " symptomatic medications.  - I reviewed additional diagnostic options: Yes.  - I reviewed additional therapeutic options: Yes  - I reviewed psychosocial interventions:  Yes      Assessment/Plan:  1. Uncomplicated opioid dependence (HCC)            A Controlled Substance Agreement has been signed within the last year. A urine drug screen has been done in the past year.      The patient reports that their insomnia is well controlled with the current treatment plan  They were counseled on other means to help with sleep   This patient is continuing to use a controlled substance (CS) on a long term basis.  The patient is thoroughly aware of the goals of treatment with the CS  The patient is aware that yearly and random urine drug screens are required.  The patient has been instructed to take the CS only as prescribed.  The patient is prohibited from sharing the CS with any other person.  The patient is instructed to inform the provider if any other CS is taken, of any alcohol or cannabis or other recreational drug use, any treatment for side effects of the CS or complications, if they have CS active rx in other states  The patient has evidence for a reason for the CS  The treatment plan has been discussed with the patient  The  report has been reviewed      The patient reports that their current dosage of suboxone is controlling their cravings and has been able to refrain from any illicit opiate use. We will continue with current dose and f/u in 4 weeks for efficacy          Review of Systems:  Review of Systems   Constitutional: Negative.  Negative for chills and fever.   HENT: Negative.  Negative for hearing loss.    Eyes: Negative.  Negative for blurred vision and double vision.   Respiratory: Negative.  Negative for cough and hemoptysis.    Cardiovascular: Negative.  Negative for chest pain and palpitations.   Gastrointestinal: Negative.  Negative for heartburn and nausea.   Genitourinary: Negative.  Negative for  dysuria.   Musculoskeletal: Negative.  Negative for myalgias.   Skin:  Positive for itching and rash.   Neurological: Negative.  Negative for dizziness, tingling and headaches.   Endo/Heme/Allergies: Negative.  Does not bruise/bleed easily.   Psychiatric/Behavioral: Negative.  Negative for depression and suicidal ideas.    All other systems reviewed and are negative.      Physical Exam:  Physical Exam  Vitals and nursing note reviewed.   Constitutional:       General: He is not in acute distress.     Appearance: He is well-developed. He is not diaphoretic.   HENT:      Head: Normocephalic and atraumatic.      Mouth/Throat:      Pharynx: No oropharyngeal exudate.   Eyes:      Pupils: Pupils are equal, round, and reactive to light.   Cardiovascular:      Rate and Rhythm: Normal rate and regular rhythm.      Heart sounds: Normal heart sounds. No murmur heard.     No friction rub. No gallop.   Pulmonary:      Effort: Pulmonary effort is normal. No respiratory distress.      Breath sounds: Normal breath sounds. No wheezing or rales.   Chest:      Chest wall: No tenderness.   Neurological:      Mental Status: He is alert and oriented to person, place, and time.   Psychiatric:         Behavior: Behavior normal.         Thought Content: Thought content normal.         Judgment: Judgment normal.         Labs:  No results found for this or any previous visit (from the past 24 hour(s)).    Hemodynamics:  No data recorded.     Pulse  Av  Min: 69  Max: 69  Blood Pressure: 128/82     Respiratory:    Pulse Oximetry: 97 %           Fluids:  No intake or output data in the 24 hours ending 24 1525  Weight: 94.1 kg (207 lb 6.4 oz)  GI/Nutrition:  No orders of the defined types were placed in this encounter.    Medications:  Current Outpatient Medications   Medication    Buprenorphine HCl-Naloxone HCl 8-2 MG FILM    citalopram (CELEXA) 20 MG Tab    Apremilast (OTEZLA) 30 MG Tab    OTEZLA 10 & 20 & 30 MG Tablet Therapy Pack     clobetasol (TEMOVATE) 0.05 % Ointment    hydrOXYzine HCl (ATARAX) 25 MG Tab     No current facility-administered medications for this encounter.     Medical Decision Making, by Problem:  There are no active hospital problems to display for this patient.    Plan:    1. Uncomplicated opioid dependence (HCC)

## 2024-03-04 DIAGNOSIS — F11.20 UNCOMPLICATED OPIOID DEPENDENCE (HCC): ICD-10-CM

## 2024-03-04 RX ORDER — BUPRENORPHINE AND NALOXONE 8; 2 MG/1; MG/1
FILM, SOLUBLE BUCCAL; SUBLINGUAL
Qty: 60 EACH | Refills: 0 | Status: SHIPPED | OUTPATIENT
Start: 2024-03-04 | End: 2024-04-04

## 2024-03-04 NOTE — TELEPHONE ENCOUNTER
Received request via: Pharmacy    Was the patient seen in the last year in this department? Yes    Does the patient have an active prescription (recently filled or refills available) for medication(s) requested? No    Ph[facility] Name[facility]     Does the patient have group home [facility] and need 100 day supply (blood pressure, diabetes and cholesterol meds only)? Medication is not for cholesterol, blood pressure or diabetes and Patient does not have SCP

## 2024-03-14 ENCOUNTER — RX ONLY (OUTPATIENT)
Age: 44
Setting detail: RX ONLY
End: 2024-03-14

## 2024-03-14 RX ORDER — APREMILAST 30 MG/1
1 TABLET, FILM COATED ORAL QD
Qty: 30 | Refills: 2 | Status: ERX

## 2024-03-21 ENCOUNTER — RX ONLY (OUTPATIENT)
Age: 44
Setting detail: RX ONLY
End: 2024-03-21

## 2024-03-21 RX ORDER — APREMILAST 30 MG/1
1 TABLET, FILM COATED ORAL QD
Qty: 60 | Refills: 2 | Status: ERX

## 2024-03-27 ENCOUNTER — RX ONLY (OUTPATIENT)
Age: 44
Setting detail: RX ONLY
End: 2024-03-27

## 2024-03-27 RX ORDER — APREMILAST 30 MG/1
1 TABLET, FILM COATED ORAL QD
Qty: 60 | Refills: 2 | Status: ERX

## 2024-04-02 DIAGNOSIS — F11.20 UNCOMPLICATED OPIOID DEPENDENCE (HCC): ICD-10-CM

## 2024-04-02 RX ORDER — BUPRENORPHINE AND NALOXONE 8; 2 MG/1; MG/1
FILM, SOLUBLE BUCCAL; SUBLINGUAL
Qty: 60 EACH | Refills: 0 | Status: SHIPPED | OUTPATIENT
Start: 2024-04-02 | End: 2024-04-30

## 2024-04-30 DIAGNOSIS — F11.20 UNCOMPLICATED OPIOID DEPENDENCE (HCC): ICD-10-CM

## 2024-04-30 RX ORDER — BUPRENORPHINE AND NALOXONE 8; 2 MG/1; MG/1
FILM, SOLUBLE BUCCAL; SUBLINGUAL
Qty: 60 EACH | Refills: 0 | Status: SHIPPED | OUTPATIENT
Start: 2024-04-30 | End: 2024-05-30

## 2024-04-30 NOTE — TELEPHONE ENCOUNTER
Received request via: Pharmacy    Was the patient seen in the last year in this department? Yes    Does the patient have an active prescription (recently filled or refills available) for medication(s) requested? No    Ph[facility] Name[facility]    Does the patient have [facility] and need 100 day supply (blood pressure, diabetes and cholesterol meds only)? Medication is not for cholesterol, blood pressure or diabetes and Patient does not have SCP

## 2024-05-25 ENCOUNTER — HOSPITAL ENCOUNTER (OUTPATIENT)
Facility: MEDICAL CENTER | Age: 44
End: 2024-05-25
Attending: FAMILY MEDICINE
Payer: COMMERCIAL

## 2024-05-25 DIAGNOSIS — F11.20 UNCOMPLICATED OPIOID DEPENDENCE (HCC): ICD-10-CM

## 2024-05-27 LAB
AMPHET CTO UR CFM-MCNC: NEGATIVE NG/ML
BARBITURATES CTO UR CFM-MCNC: NEGATIVE NG/ML
BENZODIAZ CTO UR CFM-MCNC: NEGATIVE NG/ML
BUPRENORPHINE UR-MCNC: NORMAL NG/ML
CANNABINOIDS CTO UR CFM-MCNC: NEGATIVE NG/ML
CARISOPRODOL UR-MCNC: NEGATIVE NG/ML
COCAINE CTO UR CFM-MCNC: NEGATIVE NG/ML
CREAT UR-MCNC: 39.7 MG/DL (ref 20–400)
DRUG SCREEN COMMENT UR-IMP: NORMAL
ETHYL GLUCURONIDE UR QL SCN: NEGATIVE NG/ML
FENTANYL UR-MCNC: NEGATIVE NG/ML
MEPERIDINE CTO UR SCN-MCNC: NEGATIVE NG/ML
METHADONE CTO UR CFM-MCNC: NEGATIVE NG/ML
OPIATES UR QL SCN: NEGATIVE NG/ML
OXYCDOXYM URSCRN 2005102: NEGATIVE NG/ML
PCP CTO UR CFM-MCNC: NEGATIVE NG/ML
PROPOXYPH CTO UR CFM-MCNC: NEGATIVE NG/ML
TAPENTADOL UR-MCNC: NEGATIVE NG/ML
TRAMADOL CTO UR SCN-MCNC: NEGATIVE NG/ML
ZOLPIDEM UR-MCNC: NEGATIVE NG/ML

## 2024-05-28 ENCOUNTER — HOSPITAL ENCOUNTER (OUTPATIENT)
Dept: BEHAVIORAL HEALTH | Facility: MEDICAL CENTER | Age: 44
End: 2024-05-28
Attending: FAMILY MEDICINE
Payer: COMMERCIAL

## 2024-05-28 VITALS
SYSTOLIC BLOOD PRESSURE: 108 MMHG | HEIGHT: 70 IN | DIASTOLIC BLOOD PRESSURE: 72 MMHG | OXYGEN SATURATION: 96 % | WEIGHT: 200.4 LBS | BODY MASS INDEX: 28.69 KG/M2 | RESPIRATION RATE: 18 BRPM | HEART RATE: 91 BPM

## 2024-05-28 DIAGNOSIS — F11.20 UNCOMPLICATED OPIOID DEPENDENCE (HCC): ICD-10-CM

## 2024-05-28 PROCEDURE — 99214 OFFICE O/P EST MOD 30 MIN: CPT | Performed by: FAMILY MEDICINE

## 2024-05-28 RX ORDER — BUPRENORPHINE AND NALOXONE 8; 2 MG/1; MG/1
FILM, SOLUBLE BUCCAL; SUBLINGUAL
Qty: 60 EACH | Refills: 0 | Status: SHIPPED | OUTPATIENT
Start: 2024-05-28 | End: 2024-06-27

## 2024-05-28 ASSESSMENT — ENCOUNTER SYMPTOMS
PSYCHIATRIC NEGATIVE: 1
RESPIRATORY NEGATIVE: 1
NEUROLOGICAL NEGATIVE: 1
HEADACHES: 0
MUSCULOSKELETAL NEGATIVE: 1
CARDIOVASCULAR NEGATIVE: 1
PALPITATIONS: 0
NAUSEA: 0
DEPRESSION: 0
GASTROINTESTINAL NEGATIVE: 1
CHILLS: 0
HEARTBURN: 0
MYALGIAS: 0
COUGH: 0
BLURRED VISION: 0
HEMOPTYSIS: 0
TINGLING: 0
BRUISES/BLEEDS EASILY: 0
EYES NEGATIVE: 1
FEVER: 0
DIZZINESS: 0
CONSTITUTIONAL NEGATIVE: 1
DOUBLE VISION: 0

## 2024-05-28 ASSESSMENT — FIBROSIS 4 INDEX: FIB4 SCORE: 0.9

## 2024-05-28 NOTE — PROGRESS NOTES
Psychiatric Progress Note               Author: Otto Haines M.D. Date & Time created: 5/28/2024  3:40 PM     Interval History:  Chief Complaint   Patient presents with    Follow-Up       HISTORY OF PRESENT ILLNESS: Patient is a 43 y.o. male established patient who presents today for a med refill of a controlled substance in addition to their other issues listed in the rest of the progress note    The patient is requesting medication for the following issue: refill of suboxone for opiate use disorder  Approximate date of onset of condition was years ago.    Current Problems:    Opiate use  disorder      Patient Active Problem List    Diagnosis Date Noted    Displaced fracture of shaft of left clavicle, initial encounter for closed fracture 02/13/2023    Positive self-administered antigen test for COVID-19 01/24/2023    Uncomplicated opioid dependence (HCC) 12/15/2022    Psoriasis 12/15/2022    High risk medication use 12/15/2022    Drug-induced immunodeficiency (HCC) 12/15/2022    Low libido 09/01/2022    Hair loss 02/11/2022    Other chest pain 02/11/2022    Anxiety 02/11/2022    Obesity (BMI 30-39.9) 02/11/2022    Pustular psoriasis of palm of hand 02/11/2022       Allergies:Patient has no known allergies.    Current Outpatient Medications   Medication Sig Dispense Refill    Buprenorphine HCl-Naloxone HCl 8-2 MG FILM DISSOLVE 2 FILMS ON THE TONGUE EVERY DAY 60 Each 0    citalopram (CELEXA) 20 MG Tab Take 1 Tablet by mouth every day. For anxiety 90 Tablet 3    Apremilast (OTEZLA) 30 MG Tab Take 30 mg by mouth 2 times a day. 60 Tablet 3    clobetasol (TEMOVATE) 0.05 % Ointment       hydrOXYzine HCl (ATARAX) 25 MG Tab Take 1 Tablet by mouth 3 times a day as needed for Itching. 30 Tablet 0    OTEZLA 10 & 20 & 30 MG Tablet Therapy Pack FOLLOW PACKAGE DIRECTIONS (Patient not taking: Reported on 10/26/2023)       No current facility-administered medications for this encounter.         I have discussed with the  "patient that with any controlled substance prescription it is vital to have these medications in a safe, secure environment. I have discussed that it is their responsibility that their medications are not accessible to anyone else who may use them inadvertently.    I have discussed with the patient that any controlled substance can impair the ability to drive or operate any machinery and that the patient should not use them if they plan on driving or operating machinery.    I have reviewed and updated the past medical/surgical, family history and social history as of today.    ROS:  Review of Systems   Constitutional: Negative for fever, chills, weight loss and malaise/fatigue.   Respiratory: Negative for cough, sputum production, shortness of breath and wheezing.    Cardiovascular: Negative for chest pain, palpitations, orthopnea and leg swelling.   Gastrointestinal: Negative for heartburn, nausea, vomiting, constipation and abdominal pain.  Musculoskeletal:  neg  Skin: Negative for rash and itching.   Neurological: neg  Psychiatric/Behavioral: Negative for signs or symptoms of depression,, suicidal or homicidal ideation.  Patient able to contract for their safety.   All other systems reviewed and are negative except as in HPI.      Exam:  /72   Pulse 91   Resp 18   Ht 1.778 m (5' 10\")   Wt 90.9 kg (200 lb 6.4 oz)   SpO2 96%   General:  male patient who appears in no distress        DISCUSSION OF CARE PLAN:    - I discussed management options. I reviewed symptomatic care     - I will obtain/review additional records if needed    - I discussed efforts with home exercise program and activity modification     - I reviewed medication monitoring.       The patient was advised to avoid alcohol use with prescribed medication. I counseled the patient regarding risks, side effects, and interactions of medications. I counseled the patient on the controlled substance prescribing program. I reviewed further " symptomatic medications.  - I reviewed additional diagnostic options: Yes.  - I reviewed additional therapeutic options: Yes  - I reviewed psychosocial interventions:  Yes      Assessment/Plan:  1. Uncomplicated opioid dependence (HCC)  Buprenorphine HCl-Naloxone HCl 8-2 MG FILM          A Controlled Substance Agreement has been signed within the last year. A urine drug screen has been done in the past year.      The patient reports that their insomnia is well controlled with the current treatment plan  They were counseled on other means to help with sleep   This patient is continuing to use a controlled substance (CS) on a long term basis.  The patient is thoroughly aware of the goals of treatment with the CS  The patient is aware that yearly and random urine drug screens are required.  The patient has been instructed to take the CS only as prescribed.  The patient is prohibited from sharing the CS with any other person.  The patient is instructed to inform the provider if any other CS is taken, of any alcohol or cannabis or other recreational drug use, any treatment for side effects of the CS or complications, if they have CS active rx in other states  The patient has evidence for a reason for the CS  The treatment plan has been discussed with the patient  The  report has been reviewed      The patient reports that their current dosage of suboxone is controlling their cravings and has been able to refrain from any illicit opiate use. We will continue with current dose and f/u in 4 weeks for efficacy          Review of Systems:  Review of Systems   Constitutional: Negative.  Negative for chills and fever.   HENT: Negative.  Negative for hearing loss.    Eyes: Negative.  Negative for blurred vision and double vision.   Respiratory: Negative.  Negative for cough and hemoptysis.    Cardiovascular: Negative.  Negative for chest pain and palpitations.   Gastrointestinal: Negative.  Negative for heartburn and nausea.    Genitourinary: Negative.  Negative for dysuria.   Musculoskeletal: Negative.  Negative for myalgias.   Skin: Negative.  Negative for rash.   Neurological: Negative.  Negative for dizziness, tingling and headaches.   Endo/Heme/Allergies: Negative.  Does not bruise/bleed easily.   Psychiatric/Behavioral: Negative.  Negative for depression and suicidal ideas.    All other systems reviewed and are negative.      Physical Exam:  Physical Exam  Vitals reviewed.   Constitutional:       Appearance: Normal appearance.   HENT:      Head: Normocephalic.   Eyes:      Extraocular Movements: Extraocular movements intact.   Neurological:      Mental Status: He is alert and oriented to person, place, and time.   Psychiatric:         Mood and Affect: Mood normal.         Thought Content: Thought content normal.         Judgment: Judgment normal.         Labs:  No results found for this or any previous visit (from the past 24 hour(s)).    Hemodynamics:  No data recorded.     Pulse  Av  Min: 91  Max: 91  Blood Pressure: 108/72     Respiratory:    Respiration: 18, Pulse Oximetry: 96 %           Fluids:  No intake or output data in the 24 hours ending 24 1540  Weight: 90.9 kg (200 lb 6.4 oz)  GI/Nutrition:  No orders of the defined types were placed in this encounter.    Medications:  Current Outpatient Medications   Medication    Buprenorphine HCl-Naloxone HCl 8-2 MG FILM    citalopram (CELEXA) 20 MG Tab    Apremilast (OTEZLA) 30 MG Tab    clobetasol (TEMOVATE) 0.05 % Ointment    hydrOXYzine HCl (ATARAX) 25 MG Tab    OTEZLA 10 & 20 & 30 MG Tablet Therapy Pack     No current facility-administered medications for this encounter.     Medical Decision Making, by Problem:  There are no active hospital problems to display for this patient.    Plan:    1. Uncomplicated opioid dependence (HCC)  Buprenorphine HCl-Naloxone HCl 8-2 MG FILM

## 2024-05-30 LAB
BUPRENO GLUC URQNT 2010093: 75 NG/ML
BUPRENORPH URQNT L34157: <2 NG/ML
BUPRENORPHINE UR-MCNC: 46 NG/ML
NALOX URQNT 2005863: <100 NG/ML
NORBUPR GLUC URQNT 2010094: 246 NG/ML

## 2024-06-24 DIAGNOSIS — F11.20 UNCOMPLICATED OPIOID DEPENDENCE (HCC): ICD-10-CM

## 2024-06-24 RX ORDER — BUPRENORPHINE AND NALOXONE 8; 2 MG/1; MG/1
FILM, SOLUBLE BUCCAL; SUBLINGUAL
Qty: 60 EACH | Refills: 0 | Status: SHIPPED | OUTPATIENT
Start: 2024-06-24 | End: 2024-07-24

## 2024-06-24 NOTE — TELEPHONE ENCOUNTER
Received request via: Pharmacy    Was the patient seen in the last year in this department? Yes    Does the patient have an active prescription (recently filled or refills available) for medication(s) requested? No    [facility] Name[facility]    Does the patient have nursing home Plus and need 100 day supply (blood pressure, diabetes and cholesterol meds only)? Medication is not for cholesterol, blood pressure or diabetes and Patient does not have SCP

## 2024-07-22 DIAGNOSIS — F11.20 UNCOMPLICATED OPIOID DEPENDENCE (HCC): ICD-10-CM

## 2024-07-22 RX ORDER — BUPRENORPHINE AND NALOXONE 8; 2 MG/1; MG/1
FILM, SOLUBLE BUCCAL; SUBLINGUAL
Qty: 60 EACH | Refills: 0 | Status: SHIPPED | OUTPATIENT
Start: 2024-07-22 | End: 2024-08-22

## 2024-08-20 ENCOUNTER — HOSPITAL ENCOUNTER (OUTPATIENT)
Dept: BEHAVIORAL HEALTH | Facility: MEDICAL CENTER | Age: 44
End: 2024-08-20
Attending: FAMILY MEDICINE
Payer: COMMERCIAL

## 2024-08-20 VITALS
SYSTOLIC BLOOD PRESSURE: 110 MMHG | WEIGHT: 199.6 LBS | HEART RATE: 92 BPM | DIASTOLIC BLOOD PRESSURE: 70 MMHG | BODY MASS INDEX: 28.64 KG/M2

## 2024-08-20 DIAGNOSIS — F11.20 UNCOMPLICATED OPIOID DEPENDENCE (HCC): ICD-10-CM

## 2024-08-20 PROCEDURE — 99214 OFFICE O/P EST MOD 30 MIN: CPT | Performed by: FAMILY MEDICINE

## 2024-08-20 PROCEDURE — 99214 OFFICE O/P EST MOD 30 MIN: CPT | Mod: 25 | Performed by: FAMILY MEDICINE

## 2024-08-20 RX ORDER — BUPRENORPHINE AND NALOXONE 8; 2 MG/1; MG/1
FILM, SOLUBLE BUCCAL; SUBLINGUAL
Qty: 60 EACH | Refills: 0 | Status: SHIPPED | OUTPATIENT
Start: 2024-08-20 | End: 2024-09-20

## 2024-08-20 ASSESSMENT — FIBROSIS 4 INDEX: FIB4 SCORE: 0.92

## 2024-08-20 ASSESSMENT — ENCOUNTER SYMPTOMS
HEARTBURN: 0
CARDIOVASCULAR NEGATIVE: 1
NAUSEA: 0
PALPITATIONS: 0
GASTROINTESTINAL NEGATIVE: 1
FEVER: 0
TINGLING: 0
MUSCULOSKELETAL NEGATIVE: 1
HEMOPTYSIS: 0
COUGH: 0
DOUBLE VISION: 0
DEPRESSION: 0
MYALGIAS: 0
PSYCHIATRIC NEGATIVE: 1
CONSTITUTIONAL NEGATIVE: 1
BLURRED VISION: 0
HEADACHES: 0
BRUISES/BLEEDS EASILY: 0
EYES NEGATIVE: 1
CHILLS: 0
RESPIRATORY NEGATIVE: 1
DIZZINESS: 0
NEUROLOGICAL NEGATIVE: 1

## 2024-08-20 ASSESSMENT — PATIENT HEALTH QUESTIONNAIRE - PHQ9
SUM OF ALL RESPONSES TO PHQ QUESTIONS 1-9: 3
5. POOR APPETITE OR OVEREATING: 0 - NOT AT ALL
CLINICAL INTERPRETATION OF PHQ2 SCORE: 2

## 2024-08-20 ASSESSMENT — ANXIETY QUESTIONNAIRES
2. NOT BEING ABLE TO STOP OR CONTROL WORRYING: SEVERAL DAYS
1. FEELING NERVOUS, ANXIOUS, OR ON EDGE: SEVERAL DAYS
3. WORRYING TOO MUCH ABOUT DIFFERENT THINGS: SEVERAL DAYS
4. TROUBLE RELAXING: SEVERAL DAYS
6. BECOMING EASILY ANNOYED OR IRRITABLE: SEVERAL DAYS
GAD7 TOTAL SCORE: 6
7. FEELING AFRAID AS IF SOMETHING AWFUL MIGHT HAPPEN: NOT AT ALL
5. BEING SO RESTLESS THAT IT IS HARD TO SIT STILL: SEVERAL DAYS

## 2024-08-20 NOTE — PROGRESS NOTES
Psychiatric Progress Note               Author: Otto Haines M.D. Date & Time created: 8/20/2024  3:33 PM     Interval History:  No chief complaint on file.      HISTORY OF PRESENT ILLNESS: Patient is a 44 y.o. male established patient who presents today for a med refill of a controlled substance in addition to their other issues listed in the rest of the progress note    The patient is requesting medication for the following issue: refill of suboxone for opiate use disorder  Approximate date of onset of condition was years ago.    Current Problems:    Opiate use  disorder      Patient Active Problem List    Diagnosis Date Noted    Displaced fracture of shaft of left clavicle, initial encounter for closed fracture 02/13/2023    Positive self-administered antigen test for COVID-19 01/24/2023    Uncomplicated opioid dependence (HCC) 12/15/2022    Psoriasis 12/15/2022    High risk medication use 12/15/2022    Drug-induced immunodeficiency (HCC) 12/15/2022    Low libido 09/01/2022    Hair loss 02/11/2022    Other chest pain 02/11/2022    Anxiety 02/11/2022    Obesity (BMI 30-39.9) 02/11/2022    Pustular psoriasis of palm of hand 02/11/2022       Allergies:Patient has no known allergies.    Current Outpatient Medications   Medication Sig Dispense Refill    Buprenorphine HCl-Naloxone HCl 8-2 MG FILM DISSOLVE 2 FILMS ON THE TONGUE ONCE DAILY FOR 30 DAYS. 60 Each 0    citalopram (CELEXA) 20 MG Tab Take 1 Tablet by mouth every day. For anxiety 90 Tablet 3    Apremilast (OTEZLA) 30 MG Tab Take 30 mg by mouth 2 times a day. 60 Tablet 3    OTEZLA 10 & 20 & 30 MG Tablet Therapy Pack FOLLOW PACKAGE DIRECTIONS (Patient not taking: Reported on 10/26/2023)      clobetasol (TEMOVATE) 0.05 % Ointment       hydrOXYzine HCl (ATARAX) 25 MG Tab Take 1 Tablet by mouth 3 times a day as needed for Itching. 30 Tablet 0     No current facility-administered medications for this encounter.         I have discussed with the patient that with  any controlled substance prescription it is vital to have these medications in a safe, secure environment. I have discussed that it is their responsibility that their medications are not accessible to anyone else who may use them inadvertently.    I have discussed with the patient that any controlled substance can impair the ability to drive or operate any machinery and that the patient should not use them if they plan on driving or operating machinery.    I have reviewed and updated the past medical/surgical, family history and social history as of today.    ROS:  Review of Systems   Constitutional: Negative for fever, chills, weight loss and malaise/fatigue.   Respiratory: Negative for cough, sputum production, shortness of breath and wheezing.    Cardiovascular: Negative for chest pain, palpitations, orthopnea and leg swelling.   Gastrointestinal: Negative for heartburn, nausea, vomiting, constipation and abdominal pain.  Musculoskeletal:  neg  Skin: Negative for rash and itching.   Neurological: neg  Psychiatric/Behavioral: Negative for signs or symptoms of depression,, suicidal or homicidal ideation.  Patient able to contract for their safety.   All other systems reviewed and are negative except as in HPI.      Exam:  /70 (BP Location: Left arm, Patient Position: Sitting, BP Cuff Size: Adult)   Pulse 92   Wt 90.5 kg (199 lb 9.6 oz)   General:  male patient who appears in no distress        DISCUSSION OF CARE PLAN:    - I discussed management options. I reviewed symptomatic care     - I will obtain/review additional records if needed    - I discussed efforts with home exercise program and activity modification     - I reviewed medication monitoring.       The patient was advised to avoid alcohol use with prescribed medication. I counseled the patient regarding risks, side effects, and interactions of medications. I counseled the patient on the controlled substance prescribing program. I reviewed further  symptomatic medications.  - I reviewed additional diagnostic options: Yes.  - I reviewed additional therapeutic options: Yes  - I reviewed psychosocial interventions:  Yes      Assessment/Plan:  1. Uncomplicated opioid dependence (HCC)  Buprenorphine HCl-Naloxone HCl 8-2 MG FILM          A Controlled Substance Agreement has been signed within the last year. A urine drug screen has been done in the past year.      The patient reports that their insomnia is well controlled with the current treatment plan  They were counseled on other means to help with sleep   This patient is continuing to use a controlled substance (CS) on a long term basis.  The patient is thoroughly aware of the goals of treatment with the CS  The patient is aware that yearly and random urine drug screens are required.  The patient has been instructed to take the CS only as prescribed.  The patient is prohibited from sharing the CS with any other person.  The patient is instructed to inform the provider if any other CS is taken, of any alcohol or cannabis or other recreational drug use, any treatment for side effects of the CS or complications, if they have CS active rx in other states  The patient has evidence for a reason for the CS  The treatment plan has been discussed with the patient  The  report has been reviewed      The patient reports that their current dosage of suboxone is controlling their cravings and has been able to refrain from any illicit opiate use. We will continue with current dose and f/u in 4 weeks for efficacy          Review of Systems:  Review of Systems   Constitutional: Negative.  Negative for chills and fever.   HENT: Negative.  Negative for hearing loss.    Eyes: Negative.  Negative for blurred vision and double vision.   Respiratory: Negative.  Negative for cough and hemoptysis.    Cardiovascular: Negative.  Negative for chest pain and palpitations.   Gastrointestinal: Negative.  Negative for heartburn and nausea.    Genitourinary: Negative.  Negative for dysuria.   Musculoskeletal: Negative.  Negative for myalgias.   Skin: Negative.  Negative for rash.   Neurological: Negative.  Negative for dizziness, tingling and headaches.   Endo/Heme/Allergies: Negative.  Does not bruise/bleed easily.   Psychiatric/Behavioral: Negative.  Negative for depression and suicidal ideas.    All other systems reviewed and are negative.      Physical Exam:  Physical Exam  Vitals reviewed.   Constitutional:       Appearance: Normal appearance.   HENT:      Head: Normocephalic.   Eyes:      Extraocular Movements: Extraocular movements intact.   Neurological:      Mental Status: He is alert and oriented to person, place, and time.   Psychiatric:         Mood and Affect: Mood normal.         Thought Content: Thought content normal.         Judgment: Judgment normal.         Labs:  No results found for this or any previous visit (from the past 24 hour(s)).    Hemodynamics:  No data recorded.     Pulse  Av  Min: 92  Max: 92  Blood Pressure: 110/70     Respiratory:                Fluids:  No intake or output data in the 24 hours ending 24 1533  Weight: 90.5 kg (199 lb 9.6 oz)  GI/Nutrition:  No orders of the defined types were placed in this encounter.    Medications:  Current Outpatient Medications   Medication    Buprenorphine HCl-Naloxone HCl 8-2 MG FILM    citalopram (CELEXA) 20 MG Tab    Apremilast (OTEZLA) 30 MG Tab    OTEZLA 10 & 20 & 30 MG Tablet Therapy Pack    clobetasol (TEMOVATE) 0.05 % Ointment    hydrOXYzine HCl (ATARAX) 25 MG Tab     No current facility-administered medications for this encounter.     Medical Decision Making, by Problem:  There are no active hospital problems to display for this patient.    Plan:  1. Uncomplicated opioid dependence (HCC)  Buprenorphine HCl-Naloxone HCl 8-2 MG FILM

## 2024-09-19 DIAGNOSIS — F11.20 UNCOMPLICATED OPIOID DEPENDENCE (HCC): ICD-10-CM

## 2024-09-19 RX ORDER — BUPRENORPHINE AND NALOXONE 8; 2 MG/1; MG/1
FILM, SOLUBLE BUCCAL; SUBLINGUAL
Qty: 60 EACH | Refills: 0 | Status: SHIPPED | OUTPATIENT
Start: 2024-09-19 | End: 2024-10-20

## 2024-09-19 NOTE — TELEPHONE ENCOUNTER
Follow up 11/21/24  Received request via: Patient    Was the patient seen in the last year in this department? Yes    Does the patient have an active prescription (recently filled or refills available) for medication(s) requested? No    Pharmacy Name: [id number] - [place], [place] - [id number] AT [facility] [address]     Does the patient have snf Plus and need 100-day supply? (This applies to ALL medications) Patient does not have SCP

## 2024-10-14 DIAGNOSIS — F11.20 UNCOMPLICATED OPIOID DEPENDENCE (HCC): ICD-10-CM

## 2024-10-15 RX ORDER — BUPRENORPHINE AND NALOXONE 8; 2 MG/1; MG/1
FILM, SOLUBLE BUCCAL; SUBLINGUAL
Qty: 60 EACH | Refills: 0 | Status: SHIPPED | OUTPATIENT
Start: 2024-10-15 | End: 2024-10-22 | Stop reason: SDUPTHER

## 2024-10-22 DIAGNOSIS — F11.20 UNCOMPLICATED OPIOID DEPENDENCE (HCC): ICD-10-CM

## 2024-10-23 RX ORDER — BUPRENORPHINE AND NALOXONE 8; 2 MG/1; MG/1
FILM, SOLUBLE BUCCAL; SUBLINGUAL
Qty: 60 EACH | Refills: 0 | Status: SHIPPED | OUTPATIENT
Start: 2024-10-23 | End: 2024-11-22

## 2024-11-11 ENCOUNTER — TELEPHONE (OUTPATIENT)
Dept: BEHAVIORAL HEALTH | Facility: CLINIC | Age: 44
End: 2024-11-11
Payer: COMMERCIAL

## 2024-11-12 NOTE — TELEPHONE ENCOUNTER
VOICEMAIL  1. Caller Name: Elle                      Call Back Number: [phone]    2. Message: LV stating to have patient call us back to reschedule appt on 11/21/24 due to Dr. Haines being out of office.    3. Patient approves office to leave a detailed voicemail/MyChart message: N\A

## 2024-11-20 ENCOUNTER — RX ONLY (OUTPATIENT)
Age: 44
Setting detail: RX ONLY
End: 2024-11-20

## 2024-11-20 RX ORDER — APREMILAST 30 MG/1
1 TABLET, FILM COATED ORAL QD
Qty: 60 | Refills: 2 | Status: CANCELLED

## 2024-11-22 ENCOUNTER — APPOINTMENT (RX ONLY)
Dept: URBAN - METROPOLITAN AREA CLINIC 15 | Facility: CLINIC | Age: 44
Setting detail: DERMATOLOGY
End: 2024-11-22

## 2024-11-22 DIAGNOSIS — L40.0 PSORIASIS VULGARIS: ICD-10-CM

## 2024-11-22 DIAGNOSIS — R21 RASH AND OTHER NONSPECIFIC SKIN ERUPTION: ICD-10-CM

## 2024-11-22 PROCEDURE — ? COUNSELING

## 2024-11-22 PROCEDURE — ? BIOPSY BY SHAVE METHOD

## 2024-11-22 PROCEDURE — ? DIAGNOSIS COMMENT

## 2024-11-22 PROCEDURE — 99213 OFFICE O/P EST LOW 20 MIN: CPT | Mod: 25

## 2024-11-22 PROCEDURE — ? ADDITIONAL NOTES

## 2024-11-22 PROCEDURE — 11102 TANGNTL BX SKIN SINGLE LES: CPT

## 2024-11-22 PROCEDURE — ? PRESCRIPTION

## 2024-11-22 PROCEDURE — 11103 TANGNTL BX SKIN EA SEP/ADDL: CPT

## 2024-11-22 RX ORDER — APREMILAST 30 MG/1
TABLET, FILM COATED ORAL BID
Qty: 60 | Refills: 12 | Status: ERX | COMMUNITY
Start: 2024-11-22

## 2024-11-22 RX ORDER — TRIAMCINOLONE ACETONIDE 1 MG/G
CREAM TOPICAL BID
Qty: 453.6 | Refills: 3 | Status: ERX | COMMUNITY
Start: 2024-11-22

## 2024-11-22 RX ADMIN — APREMILAST: 30 TABLET, FILM COATED ORAL at 00:00

## 2024-11-22 RX ADMIN — TRIAMCINOLONE ACETONIDE: 1 CREAM TOPICAL at 00:00

## 2024-11-22 ASSESSMENT — LOCATION ZONE DERM
LOCATION ZONE: TRUNK
LOCATION ZONE: ARM
LOCATION ZONE: LEG

## 2024-11-22 ASSESSMENT — LOCATION SIMPLE DESCRIPTION DERM
LOCATION SIMPLE: LEFT BUTTOCK
LOCATION SIMPLE: LEFT FOREARM
LOCATION SIMPLE: RIGHT SHOULDER
LOCATION SIMPLE: RIGHT FOREARM
LOCATION SIMPLE: ABDOMEN
LOCATION SIMPLE: CHEST
LOCATION SIMPLE: RIGHT BUTTOCK
LOCATION SIMPLE: GROIN
LOCATION SIMPLE: LEFT THIGH
LOCATION SIMPLE: RIGHT THIGH

## 2024-11-22 ASSESSMENT — LOCATION DETAILED DESCRIPTION DERM
LOCATION DETAILED: LEFT ANTERIOR PROXIMAL THIGH
LOCATION DETAILED: LEFT DISTAL DORSAL FOREARM
LOCATION DETAILED: RIGHT DISTAL DORSAL FOREARM
LOCATION DETAILED: EPIGASTRIC SKIN
LOCATION DETAILED: RIGHT LATERAL SUPERIOR CHEST
LOCATION DETAILED: RIGHT BUTTOCK
LOCATION DETAILED: LEFT MEDIAL SUPERIOR CHEST
LOCATION DETAILED: LEFT BUTTOCK
LOCATION DETAILED: SUPRAPUBIC SKIN
LOCATION DETAILED: RIGHT ANTERIOR PROXIMAL THIGH
LOCATION DETAILED: RIGHT ANTERIOR SHOULDER

## 2024-11-22 ASSESSMENT — ITCH NUMERIC RATING SCALE: HOW SEVERE IS YOUR ITCHING?: 10

## 2024-11-22 ASSESSMENT — PGA PSORIASIS: PGA PSORIASIS 2020: MODERATE

## 2024-11-22 ASSESSMENT — BSA PSORIASIS: % BODY COVERED IN PSORIASIS: 40

## 2024-11-22 NOTE — PROCEDURE: ADDITIONAL NOTES
Render Risk Assessment In Note?: no
Additional Notes: Discussed that since pt is not responding to treatment of Otezla or Tremfya that biopsy is recommended to rule any other underlying condition that could mimic psoriasis. Recommended to apply steroid in the morning before work as he works at a [employer]. Recommended ice packs for itching, gave pt Otezla start pack to keep him at bay until we get biopsy results back.
Detail Level: Zone

## 2024-11-22 NOTE — PROCEDURE: MIPS QUALITY
Quality 431: Preventive Care And Screening: Unhealthy Alcohol Use - Screening: Patient not identified as an unhealthy alcohol user when screened for unhealthy alcohol use using a systematic screening method
Quality 486: Dermatitis - Improvement In Patient-Reported Itch Severity: Itch severity assessment score was not reduced by at least 3 points from the initial (index) score to the follow-up visit score or assessment was not completed during the follow-up encounter
Quality 226: Preventive Care And Screening: Tobacco Use: Screening And Cessation Intervention: Patient screened for tobacco use and is an ex/non-smoker
Detail Level: Detailed
Quality 130: Documentation Of Current Medications In The Medical Record: Current Medications Documented

## 2024-12-10 DIAGNOSIS — F11.20 UNCOMPLICATED OPIOID DEPENDENCE (HCC): ICD-10-CM

## 2024-12-11 RX ORDER — BUPRENORPHINE AND NALOXONE 8; 2 MG/1; MG/1
FILM, SOLUBLE BUCCAL; SUBLINGUAL
Qty: 60 EACH | Refills: 0 | Status: SHIPPED | OUTPATIENT
Start: 2024-12-11 | End: 2025-01-09

## 2024-12-11 NOTE — TELEPHONE ENCOUNTER
Received request via: Pharmacy    Was the patient seen in the last year in this department? Yes    Does the patient have an active prescription (recently filled or refills available) for medication(s) requested? No    Pharmacy Name: [facility]     Does the patient have [facility] Plus and need 100-day supply? (This applies to ALL medications) Patient does not have SCP

## 2024-12-12 ENCOUNTER — HOSPITAL ENCOUNTER (OUTPATIENT)
Dept: BEHAVIORAL HEALTH | Facility: MEDICAL CENTER | Age: 44
End: 2024-12-12
Attending: FAMILY MEDICINE
Payer: COMMERCIAL

## 2024-12-12 VITALS
SYSTOLIC BLOOD PRESSURE: 118 MMHG | OXYGEN SATURATION: 94 % | WEIGHT: 206.8 LBS | BODY MASS INDEX: 29.67 KG/M2 | HEART RATE: 76 BPM | DIASTOLIC BLOOD PRESSURE: 64 MMHG

## 2024-12-12 DIAGNOSIS — F11.20 UNCOMPLICATED OPIOID DEPENDENCE (HCC): ICD-10-CM

## 2024-12-12 PROCEDURE — 99214 OFFICE O/P EST MOD 30 MIN: CPT | Performed by: FAMILY MEDICINE

## 2024-12-12 ASSESSMENT — FIBROSIS 4 INDEX: FIB4 SCORE: 0.92

## 2024-12-12 ASSESSMENT — ENCOUNTER SYMPTOMS
DIZZINESS: 0
HEARTBURN: 0
CONSTITUTIONAL NEGATIVE: 1
DEPRESSION: 0
HEADACHES: 0
FEVER: 0
COUGH: 0
PSYCHIATRIC NEGATIVE: 1
MYALGIAS: 0
NAUSEA: 0
BRUISES/BLEEDS EASILY: 0
MUSCULOSKELETAL NEGATIVE: 1
CARDIOVASCULAR NEGATIVE: 1
RESPIRATORY NEGATIVE: 1
DOUBLE VISION: 0
TINGLING: 0
HEMOPTYSIS: 0
PALPITATIONS: 0
GASTROINTESTINAL NEGATIVE: 1
NEUROLOGICAL NEGATIVE: 1
BLURRED VISION: 0
CHILLS: 0
EYES NEGATIVE: 1

## 2024-12-13 ENCOUNTER — APPOINTMENT (OUTPATIENT)
Dept: URBAN - METROPOLITAN AREA CLINIC 15 | Facility: CLINIC | Age: 44
Setting detail: DERMATOLOGY
End: 2024-12-13

## 2024-12-13 DIAGNOSIS — R21 RASH AND OTHER NONSPECIFIC SKIN ERUPTION: ICD-10-CM

## 2024-12-13 PROCEDURE — 11102 TANGNTL BX SKIN SINGLE LES: CPT

## 2024-12-13 PROCEDURE — ? BIOPSY BY SHAVE METHOD FOR DIF

## 2024-12-13 PROCEDURE — ? COUNSELING

## 2024-12-13 ASSESSMENT — LOCATION SIMPLE DESCRIPTION DERM
LOCATION SIMPLE: RIGHT FOOT
LOCATION SIMPLE: RIGHT CLAVICULAR SKIN
LOCATION SIMPLE: LEFT THIGH
LOCATION SIMPLE: RIGHT CHEEK
LOCATION SIMPLE: LEFT FOOT
LOCATION SIMPLE: ABDOMEN
LOCATION SIMPLE: CHEST
LOCATION SIMPLE: LEFT HAND
LOCATION SIMPLE: LEFT LOWER BACK
LOCATION SIMPLE: RIGHT HAND
LOCATION SIMPLE: RIGHT THIGH

## 2024-12-13 ASSESSMENT — LOCATION DETAILED DESCRIPTION DERM
LOCATION DETAILED: RIGHT ANTERIOR PROXIMAL THIGH
LOCATION DETAILED: LEFT DORSAL FOOT
LOCATION DETAILED: PERIUMBILICAL SKIN
LOCATION DETAILED: XIPHOID
LOCATION DETAILED: LEFT INFERIOR MEDIAL LOWER BACK
LOCATION DETAILED: LEFT ANTERIOR PROXIMAL THIGH
LOCATION DETAILED: STERNUM
LOCATION DETAILED: RIGHT CLAVICULAR SKIN
LOCATION DETAILED: RIGHT ULNAR PALM
LOCATION DETAILED: RIGHT DORSAL FOOT
LOCATION DETAILED: RIGHT INFERIOR CENTRAL MALAR CHEEK
LOCATION DETAILED: LEFT THENAR EMINENCE

## 2024-12-13 ASSESSMENT — LOCATION ZONE DERM
LOCATION ZONE: LEG
LOCATION ZONE: HAND
LOCATION ZONE: FEET
LOCATION ZONE: TRUNK
LOCATION ZONE: FACE

## 2024-12-13 NOTE — PROCEDURE: BIOPSY BY SHAVE METHOD FOR DIF
Detail Level: Detailed
Depth Of Biopsy: dermis
Was A Bandage Applied: Yes
Size Of Lesion In Cm: 0
Biopsy Type: DIF (perilesional)
Biopsy Method: Dermablade
Anesthesia Type: 1% lidocaine with epinephrine
Anesthesia Volume In Cc: 1.5
Hemostasis: Electrocautery
Wound Care: Petrolatum
Dressing: bandage
Render Path Notes In Note?: No
Consent: Verbal consent was obtained and risks were reviewed including but not limited to scarring, infection, bleeding, scabbing, incomplete removal, nerve damage and allergy to anesthesia.
Post-Care Instructions: I reviewed with the patient in detail post-care instructions. Patient is to keep the biopsy site dry overnight, and then apply bacitracin twice daily until healed. Patient may apply hydrogen peroxide soaks to remove any crusting.
Notification Instructions: Patient will be notified of biopsy results. However, patient instructed to call the office if not contacted within 2 weeks.
Billing Type: Third-Party Bill
Information: Selecting Yes will display possible errors in your note based on the variables you have selected. This validation is only offered as a suggestion for you. PLEASE NOTE THAT THE VALIDATION TEXT WILL BE REMOVED WHEN YOU FINALIZE YOUR NOTE. IF YOU WANT TO FAX A PRELIMINARY NOTE YOU WILL NEED TO TOGGLE THIS TO 'NO' IF YOU DO NOT WANT IT IN YOUR FAXED NOTE.

## 2024-12-13 NOTE — PROGRESS NOTES
Psychiatric Progress Note               Author: Otto Haines M.D. Date & Time created: 12/12/2024  4:00 PM     Interval History:  Chief Complaint   Patient presents with    Follow-Up       HISTORY OF PRESENT ILLNESS: Patient is a 44 y.o. male established patient who presents today for a med refill of a controlled substance in addition to their other issues listed in the rest of the progress note    The patient is requesting medication for the following issue: refill of suboxone for opiate use disorder  Approximate date of onset of condition was years ago.    Current Problems:    Opiate use  disorder      Patient Active Problem List    Diagnosis Date Noted    Displaced fracture of shaft of left clavicle, initial encounter for closed fracture 02/13/2023    Positive self-administered antigen test for COVID-19 01/24/2023    Uncomplicated opioid dependence (HCC) 12/15/2022    Psoriasis 12/15/2022    High risk medication use 12/15/2022    Drug-induced immunodeficiency (HCC) 12/15/2022    Low libido 09/01/2022    Hair loss 02/11/2022    Other chest pain 02/11/2022    Anxiety 02/11/2022    Obesity (BMI 30-39.9) 02/11/2022    Pustular psoriasis of palm of hand 02/11/2022       Allergies:Patient has no known allergies.    Current Outpatient Medications   Medication Sig Dispense Refill    Buprenorphine HCl-Naloxone HCl 8-2 MG FILM DISSOLVE 2 FILMS ON THE TONGUE ONCE DAILY FOR 30 DAYS. 60 Each 0    citalopram (CELEXA) 20 MG Tab Take 1 Tablet by mouth every day. For anxiety 90 Tablet 3    Apremilast (OTEZLA) 30 MG Tab Take 30 mg by mouth 2 times a day. 60 Tablet 3    clobetasol (TEMOVATE) 0.05 % Ointment       hydrOXYzine HCl (ATARAX) 25 MG Tab Take 1 Tablet by mouth 3 times a day as needed for Itching. 30 Tablet 0    OTEZLA 10 & 20 & 30 MG Tablet Therapy Pack FOLLOW PACKAGE DIRECTIONS (Patient not taking: Reported on 12/12/2024)       No current facility-administered medications for this encounter.         I have discussed  with the patient that with any controlled substance prescription it is vital to have these medications in a safe, secure environment. I have discussed that it is their responsibility that their medications are not accessible to anyone else who may use them inadvertently.    I have discussed with the patient that any controlled substance can impair the ability to drive or operate any machinery and that the patient should not use them if they plan on driving or operating machinery.    I have reviewed and updated the past medical/surgical, family history and social history as of today.    ROS:  Review of Systems   Constitutional: Negative for fever, chills, weight loss and malaise/fatigue.   Respiratory: Negative for cough, sputum production, shortness of breath and wheezing.    Cardiovascular: Negative for chest pain, palpitations, orthopnea and leg swelling.   Gastrointestinal: Negative for heartburn, nausea, vomiting, constipation and abdominal pain.  Musculoskeletal:  neg  Skin: Negative for rash and itching.   Neurological: neg  Psychiatric/Behavioral: Negative for signs or symptoms of depression,, suicidal or homicidal ideation.  Patient able to contract for their safety.   All other systems reviewed and are negative except as in HPI.      Exam:  /64   Pulse 76   Wt 93.8 kg (206 lb 12.8 oz)   SpO2 94%   General:  male patient who appears in no distress        DISCUSSION OF CARE PLAN:    - I discussed management options. I reviewed symptomatic care     - I will obtain/review additional records if needed    - I discussed efforts with home exercise program and activity modification     - I reviewed medication monitoring.       The patient was advised to avoid alcohol use with prescribed medication. I counseled the patient regarding risks, side effects, and interactions of medications. I counseled the patient on the controlled substance prescribing program. I reviewed further symptomatic medications.  - I  reviewed additional diagnostic options: Yes.  - I reviewed additional therapeutic options: Yes  - I reviewed psychosocial interventions:  Yes      Assessment/Plan:  1. Uncomplicated opioid dependence (HCC)            A Controlled Substance Agreement has been signed within the last year. A urine drug screen has been done in the past year.      The patient reports that their insomnia is well controlled with the current treatment plan  They were counseled on other means to help with sleep   This patient is continuing to use a controlled substance (CS) on a long term basis.  The patient is thoroughly aware of the goals of treatment with the CS  The patient is aware that yearly and random urine drug screens are required.  The patient has been instructed to take the CS only as prescribed.  The patient is prohibited from sharing the CS with any other person.  The patient is instructed to inform the provider if any other CS is taken, of any alcohol or cannabis or other recreational drug use, any treatment for side effects of the CS or complications, if they have CS active rx in other states  The patient has evidence for a reason for the CS  The treatment plan has been discussed with the patient  The  report has been reviewed      The patient reports that their current dosage of suboxone is controlling their cravings and has been able to refrain from any illicit opiate use. We will continue with current dose and f/u in 4 weeks for efficacy      [unfilled]  Past Medical History:   Diagnosis Date    Anxiety     Psoriasis      Past Surgical History:   Procedure Laterality Date    PB OPEN TREATMENT CLAVICULAR FRACTURE INTERNAL * Left 2/17/2023    Procedure: LEFT CLAVICLE OPEN REDUCTION AND INTERNAL FIXATION;  Surgeon: Addison Orozco M.D.;  Location: [facility];  Service: Orthopedics       Social History     Tobacco Use    Smoking status: Former    Smokeless tobacco: Current    Tobacco comments:     QUIT  SMOKING 1 YR AGO, CHEWS NICOTINE GUM   Vaping Use    Vaping status: Never Used   Substance Use Topics    Alcohol use: Yes    Drug use: Never     No family history on file.      Current Outpatient Medications:     Buprenorphine HCl-Naloxone HCl 8-2 MG FILM, DISSOLVE 2 FILMS ON THE TONGUE ONCE DAILY FOR 30 DAYS., Disp: 60 Each, Rfl: 0    citalopram (CELEXA) 20 MG Tab, Take 1 Tablet by mouth every day. For anxiety, Disp: 90 Tablet, Rfl: 3    Apremilast (OTEZLA) 30 MG Tab, Take 30 mg by mouth 2 times a day., Disp: 60 Tablet, Rfl: 3    clobetasol (TEMOVATE) 0.05 % Ointment, , Disp: , Rfl:     hydrOXYzine HCl (ATARAX) 25 MG Tab, Take 1 Tablet by mouth 3 times a day as needed for Itching., Disp: 30 Tablet, Rfl: 0    OTEZLA 10 & 20 & 30 MG Tablet Therapy Pack, FOLLOW PACKAGE DIRECTIONS (Patient not taking: Reported on 12/12/2024), Disp: , Rfl:     Patient was instructed on the use of medications, either prescriptions or OTC and informed on when the appropriate follow up time period should be. In addition, patient was also instructed that should any acute worsening occur that they should notify this clinic asap or call 911.        Review of Systems:  Review of Systems   Constitutional: Negative.  Negative for chills and fever.   HENT: Negative.  Negative for hearing loss.    Eyes: Negative.  Negative for blurred vision and double vision.   Respiratory: Negative.  Negative for cough and hemoptysis.    Cardiovascular: Negative.  Negative for chest pain and palpitations.   Gastrointestinal: Negative.  Negative for heartburn and nausea.   Genitourinary: Negative.  Negative for dysuria.   Musculoskeletal: Negative.  Negative for myalgias.   Skin: Negative.  Negative for rash.   Neurological: Negative.  Negative for dizziness, tingling and headaches.   Endo/Heme/Allergies: Negative.  Does not bruise/bleed easily.   Psychiatric/Behavioral: Negative.  Negative for depression and suicidal ideas.    All other systems reviewed and are  negative.      Physical Exam:  Physical Exam  Vitals reviewed.   Constitutional:       Appearance: Normal appearance.   HENT:      Head: Normocephalic.   Eyes:      Extraocular Movements: Extraocular movements intact.   Neurological:      Mental Status: He is alert and oriented to person, place, and time.   Psychiatric:         Mood and Affect: Mood normal.         Thought Content: Thought content normal.         Judgment: Judgment normal.         Labs:  No results found for this or any previous visit (from the past 24 hours).    Hemodynamics:  No data recorded.     Pulse  Av  Min: 76  Max: 76  Blood Pressure: 118/64     Respiratory:    Pulse Oximetry: 94 %           Fluids:  No intake or output data in the 24 hours ending 24 1600  Weight: 93.8 kg (206 lb 12.8 oz)  GI/Nutrition:  No orders of the defined types were placed in this encounter.    Medications:  Current Outpatient Medications   Medication    Buprenorphine HCl-Naloxone HCl 8-2 MG FILM    citalopram (CELEXA) 20 MG Tab    Apremilast (OTEZLA) 30 MG Tab    clobetasol (TEMOVATE) 0.05 % Ointment    hydrOXYzine HCl (ATARAX) 25 MG Tab    OTEZLA 10 & 20 & 30 MG Tablet Therapy Pack     No current facility-administered medications for this encounter.     Medical Decision Making, by Problem:  There are no active hospital problems to display for this patient.    Plan:    1. Uncomplicated opioid dependence (HCC)

## 2025-01-06 DIAGNOSIS — F11.20 UNCOMPLICATED OPIOID DEPENDENCE (HCC): ICD-10-CM

## 2025-01-07 RX ORDER — BUPRENORPHINE AND NALOXONE 8; 2 MG/1; MG/1
FILM, SOLUBLE BUCCAL; SUBLINGUAL
Qty: 30 EACH | Refills: 0 | Status: SHIPPED | OUTPATIENT
Start: 2025-01-07 | End: 2025-01-08 | Stop reason: SDUPTHER

## 2025-01-08 DIAGNOSIS — F11.20 UNCOMPLICATED OPIOID DEPENDENCE (HCC): ICD-10-CM

## 2025-01-08 NOTE — TELEPHONE ENCOUNTER
Quantity was wrong on last refill please resend.     Received request via: Patient    Was the patient seen in the last year in this department? Yes    Does the patient have an active prescription (recently filled or refills available) for medication(s) requested? No    Pharmacy Name: [facility]     Does the patient have [facility] Plus and need 100-day supply? (This applies to ALL medications) Patient does not have SCP

## 2025-01-09 RX ORDER — BUPRENORPHINE AND NALOXONE 8; 2 MG/1; MG/1
FILM, SOLUBLE BUCCAL; SUBLINGUAL
Qty: 60 EACH | Refills: 0 | Status: SHIPPED | OUTPATIENT
Start: 2025-01-09 | End: 2025-02-08

## 2025-01-16 ENCOUNTER — HOSPITAL ENCOUNTER (OUTPATIENT)
Facility: MEDICAL CENTER | Age: 45
End: 2025-01-16
Attending: DERMATOLOGY
Payer: COMMERCIAL

## 2025-01-16 LAB
BASOPHILS # BLD AUTO: 0.2 % (ref 0–1.8)
BASOPHILS # BLD: 0.01 K/UL (ref 0–0.12)
EOSINOPHIL # BLD AUTO: 0.04 K/UL (ref 0–0.51)
EOSINOPHIL NFR BLD: 0.7 % (ref 0–6.9)
ERYTHROCYTE [DISTWIDTH] IN BLOOD BY AUTOMATED COUNT: 35.6 FL (ref 35.9–50)
HCT VFR BLD AUTO: 37.4 % (ref 42–52)
HGB BLD-MCNC: 13.2 G/DL (ref 14–18)
IMM GRANULOCYTES # BLD AUTO: 0.02 K/UL (ref 0–0.11)
IMM GRANULOCYTES NFR BLD AUTO: 0.4 % (ref 0–0.9)
LYMPHOCYTES # BLD AUTO: 1.5 K/UL (ref 1–4.8)
LYMPHOCYTES NFR BLD: 27.9 % (ref 22–41)
MCH RBC QN AUTO: 28.4 PG (ref 27–33)
MCHC RBC AUTO-ENTMCNC: 35.3 G/DL (ref 32.3–36.5)
MCV RBC AUTO: 80.6 FL (ref 81.4–97.8)
MONOCYTES # BLD AUTO: 0.42 K/UL (ref 0–0.85)
MONOCYTES NFR BLD AUTO: 7.8 % (ref 0–13.4)
NEUTROPHILS # BLD AUTO: 3.39 K/UL (ref 1.82–7.42)
NEUTROPHILS NFR BLD: 63 % (ref 44–72)
NRBC # BLD AUTO: 0 K/UL
NRBC BLD-RTO: 0 /100 WBC (ref 0–0.2)
PLATELET # BLD AUTO: 224 K/UL (ref 164–446)
PMV BLD AUTO: 10.7 FL (ref 9–12.9)
RBC # BLD AUTO: 4.64 M/UL (ref 4.7–6.1)
WBC # BLD AUTO: 5.4 K/UL (ref 4.8–10.8)

## 2025-01-16 PROCEDURE — 84443 ASSAY THYROID STIM HORMONE: CPT

## 2025-01-16 PROCEDURE — 84439 ASSAY OF FREE THYROXINE: CPT

## 2025-01-16 PROCEDURE — 85025 COMPLETE CBC W/AUTO DIFF WBC: CPT

## 2025-01-16 PROCEDURE — 80053 COMPREHEN METABOLIC PANEL: CPT

## 2025-01-16 PROCEDURE — 36415 COLL VENOUS BLD VENIPUNCTURE: CPT

## 2025-01-16 PROCEDURE — 84480 ASSAY TRIIODOTHYRONINE (T3): CPT

## 2025-01-16 PROCEDURE — 82955 ASSAY OF G6PD ENZYME: CPT

## 2025-01-17 LAB
ALBUMIN SERPL BCP-MCNC: 3.8 G/DL (ref 3.2–4.9)
ALBUMIN/GLOB SERPL: 1.4 G/DL
ALP SERPL-CCNC: 72 U/L (ref 30–99)
ALT SERPL-CCNC: 48 U/L (ref 2–50)
ANION GAP SERPL CALC-SCNC: 10 MMOL/L (ref 7–16)
AST SERPL-CCNC: 33 U/L (ref 12–45)
BILIRUB SERPL-MCNC: 0.3 MG/DL (ref 0.1–1.5)
BUN SERPL-MCNC: 9 MG/DL (ref 8–22)
CALCIUM ALBUM COR SERPL-MCNC: 8.6 MG/DL (ref 8.5–10.5)
CALCIUM SERPL-MCNC: 8.4 MG/DL (ref 8.5–10.5)
CHLORIDE SERPL-SCNC: 105 MMOL/L (ref 96–112)
CO2 SERPL-SCNC: 24 MMOL/L (ref 20–33)
CREAT SERPL-MCNC: 0.63 MG/DL (ref 0.5–1.4)
GFR SERPLBLD CREATININE-BSD FMLA CKD-EPI: 121 ML/MIN/1.73 M 2
GLOBULIN SER CALC-MCNC: 2.7 G/DL (ref 1.9–3.5)
GLUCOSE SERPL-MCNC: 88 MG/DL (ref 65–99)
POTASSIUM SERPL-SCNC: 3.5 MMOL/L (ref 3.6–5.5)
PROT SERPL-MCNC: 6.5 G/DL (ref 6–8.2)
SODIUM SERPL-SCNC: 139 MMOL/L (ref 135–145)
T3 SERPL-MCNC: 101 NG/DL (ref 60–181)
T4 FREE SERPL-MCNC: 0.99 NG/DL (ref 0.93–1.7)
TSH SERPL-ACNC: 0.42 UIU/ML (ref 0.35–5.5)

## 2025-01-18 LAB — G6PD RBC-CCNC: 11.9 U/G HB (ref 9.9–16.6)

## 2025-02-05 DIAGNOSIS — F11.20 UNCOMPLICATED OPIOID DEPENDENCE (HCC): ICD-10-CM

## 2025-02-06 RX ORDER — BUPRENORPHINE AND NALOXONE 8; 2 MG/1; MG/1
FILM, SOLUBLE BUCCAL; SUBLINGUAL
Qty: 60 EACH | Refills: 0 | Status: SHIPPED | OUTPATIENT
Start: 2025-02-06 | End: 2025-03-06

## 2025-02-13 ENCOUNTER — RX ONLY (RX ONLY)
Age: 45
End: 2025-02-13

## 2025-02-13 RX ORDER — DAPSONE 25 MG/1
TABLET ORAL
Qty: 60 | Refills: 1 | Status: ERX | COMMUNITY
Start: 2025-02-13

## 2025-03-04 DIAGNOSIS — F11.20 UNCOMPLICATED OPIOID DEPENDENCE (HCC): ICD-10-CM

## 2025-03-05 RX ORDER — BUPRENORPHINE AND NALOXONE 8; 2 MG/1; MG/1
FILM, SOLUBLE BUCCAL; SUBLINGUAL
Qty: 60 EACH | Refills: 0 | Status: SHIPPED | OUTPATIENT
Start: 2025-03-05 | End: 2025-04-05

## 2025-03-25 ENCOUNTER — HOSPITAL ENCOUNTER (OUTPATIENT)
Dept: BEHAVIORAL HEALTH | Facility: MEDICAL CENTER | Age: 45
End: 2025-03-25
Attending: FAMILY MEDICINE
Payer: COMMERCIAL

## 2025-03-25 VITALS
BODY MASS INDEX: 29.41 KG/M2 | DIASTOLIC BLOOD PRESSURE: 62 MMHG | HEART RATE: 75 BPM | OXYGEN SATURATION: 96 % | WEIGHT: 205 LBS | SYSTOLIC BLOOD PRESSURE: 122 MMHG

## 2025-03-25 DIAGNOSIS — F11.20 UNCOMPLICATED OPIOID DEPENDENCE (HCC): ICD-10-CM

## 2025-03-25 PROCEDURE — 99214 OFFICE O/P EST MOD 30 MIN: CPT | Performed by: FAMILY MEDICINE

## 2025-03-25 RX ORDER — DAPSONE 25 MG/1
50 TABLET ORAL DAILY
COMMUNITY
Start: 2025-02-14

## 2025-03-25 ASSESSMENT — ENCOUNTER SYMPTOMS
MUSCULOSKELETAL NEGATIVE: 1
CONSTITUTIONAL NEGATIVE: 1
CHILLS: 0
DEPRESSION: 0
PSYCHIATRIC NEGATIVE: 1
CARDIOVASCULAR NEGATIVE: 1
DIZZINESS: 0
NEUROLOGICAL NEGATIVE: 1
EYES NEGATIVE: 1
MYALGIAS: 0
HEMOPTYSIS: 0
BRUISES/BLEEDS EASILY: 0
FEVER: 0
HEARTBURN: 0
BLURRED VISION: 0
DOUBLE VISION: 0
GASTROINTESTINAL NEGATIVE: 1
COUGH: 0
RESPIRATORY NEGATIVE: 1
HEADACHES: 0
NAUSEA: 0
TINGLING: 0
PALPITATIONS: 0

## 2025-03-25 ASSESSMENT — FIBROSIS 4 INDEX: FIB4 SCORE: 0.91

## 2025-03-25 NOTE — PROGRESS NOTES
Psychiatric Progress Note               Author: Otto Haines M.D. Date & Time created: 3/25/2025  3:01 PM     Interval History:  Chief Complaint   Patient presents with    Follow-Up       HISTORY OF PRESENT ILLNESS: Patient is a 44 y.o. male established patient who presents today for a med refill of a controlled substance in addition to their other issues listed in the rest of the progress note    The patient is requesting medication for the following issue: refill of suboxone for opiate use disorder  Approximate date of onset of condition was years ago.    Current Problems:    Opiate use  disorder      Patient Active Problem List    Diagnosis Date Noted    Displaced fracture of shaft of left clavicle, initial encounter for closed fracture 02/13/2023    Positive self-administered antigen test for COVID-19 01/24/2023    Uncomplicated opioid dependence (HCC) 12/15/2022    Psoriasis 12/15/2022    High risk medication use 12/15/2022    Drug-induced immunodeficiency (HCC) 12/15/2022    Low libido 09/01/2022    Hair loss 02/11/2022    Other chest pain 02/11/2022    Anxiety 02/11/2022    Obesity (BMI 30-39.9) 02/11/2022    Pustular psoriasis of palm of hand 02/11/2022       Allergies:Patient has no known allergies.    Current Outpatient Medications   Medication Sig Dispense Refill    dapsone 25 MG Tab Take 50 mg by mouth every day.      Buprenorphine HCl-Naloxone HCl 8-2 MG FILM DISSOLVE 2 FILMS UNDER THE TONGUE ONCE DAILY 60 Each 0    citalopram (CELEXA) 20 MG Tab Take 1 Tablet by mouth every day. For anxiety 90 Tablet 3    hydrOXYzine HCl (ATARAX) 25 MG Tab Take 1 Tablet by mouth 3 times a day as needed for Itching. 30 Tablet 0    Apremilast (OTEZLA) 30 MG Tab Take 30 mg by mouth 2 times a day. (Patient not taking: Reported on 3/25/2025) 60 Tablet 3    OTEZLA 10 & 20 & 30 MG Tablet Therapy Pack FOLLOW PACKAGE DIRECTIONS (Patient not taking: Reported on 12/12/2024)      clobetasol (TEMOVATE) 0.05 % Ointment  (Patient  not taking: Reported on 3/25/2025)       No current facility-administered medications for this encounter.         I have discussed with the patient that with any controlled substance prescription it is vital to have these medications in a safe, secure environment. I have discussed that it is their responsibility that their medications are not accessible to anyone else who may use them inadvertently.    I have discussed with the patient that any controlled substance can impair the ability to drive or operate any machinery and that the patient should not use them if they plan on driving or operating machinery.    I have reviewed and updated the past medical/surgical, family history and social history as of today.    ROS:  Review of Systems   Constitutional: Negative for fever, chills, weight loss and malaise/fatigue.   Respiratory: Negative for cough, sputum production, shortness of breath and wheezing.    Cardiovascular: Negative for chest pain, palpitations, orthopnea and leg swelling.   Gastrointestinal: Negative for heartburn, nausea, vomiting, constipation and abdominal pain.  Musculoskeletal:  neg  Skin: Negative for rash and itching.   Neurological: neg  Psychiatric/Behavioral: Negative for signs or symptoms of depression,, suicidal or homicidal ideation.  Patient able to contract for their safety.   All other systems reviewed and are negative except as in HPI.      Exam:  /62   Pulse 75   Wt 93 kg (205 lb)   SpO2 96%   General:  male patient who appears in no distress        DISCUSSION OF CARE PLAN:    - I discussed management options. I reviewed symptomatic care     - I will obtain/review additional records if needed    - I discussed efforts with home exercise program and activity modification     - I reviewed medication monitoring.       The patient was advised to avoid alcohol use with prescribed medication. I counseled the patient regarding risks, side effects, and interactions of medications. I  counseled the patient on the controlled substance prescribing program. I reviewed further symptomatic medications.  - I reviewed additional diagnostic options: Yes.  - I reviewed additional therapeutic options: Yes  - I reviewed psychosocial interventions:  Yes      Assessment/Plan:  1. Uncomplicated opioid dependence (HCC)            A Controlled Substance Agreement has been signed within the last year. A urine drug screen has been done in the past year.      The patient reports that their insomnia is well controlled with the current treatment plan  They were counseled on other means to help with sleep   This patient is continuing to use a controlled substance (CS) on a long term basis.  The patient is thoroughly aware of the goals of treatment with the CS  The patient is aware that yearly and random urine drug screens are required.  The patient has been instructed to take the CS only as prescribed.  The patient is prohibited from sharing the CS with any other person.  The patient is instructed to inform the provider if any other CS is taken, of any alcohol or cannabis or other recreational drug use, any treatment for side effects of the CS or complications, if they have CS active rx in other states  The patient has evidence for a reason for the CS  The treatment plan has been discussed with the patient  The  report has been reviewed      The patient reports that their current dosage of suboxone is controlling their cravings and has been able to refrain from any illicit opiate use. We will continue with current dose and f/u in 4 weeks for efficacy      [unfilled]  Past Medical History:   Diagnosis Date    Anxiety     Psoriasis      Past Surgical History:   Procedure Laterality Date    PB OPEN TREATMENT CLAVICULAR FRACTURE INTERNAL * Left 2/17/2023    Procedure: LEFT CLAVICLE OPEN REDUCTION AND INTERNAL FIXATION;  Surgeon: Addison Orozco M.D.;  Location: [facility] [place];  Service: Orthopedics        Social History     Tobacco Use    Smoking status: Former    Smokeless tobacco: Current    Tobacco comments:     QUIT SMOKING 1 YR AGO, CHEWS NICOTINE GUM   Vaping Use    Vaping status: Never Used   Substance Use Topics    Alcohol use: Yes    Drug use: Never     No family history on file.      Current Outpatient Medications:     dapsone 25 MG Tab, Take 50 mg by mouth every day., Disp: , Rfl:     Buprenorphine HCl-Naloxone HCl 8-2 MG FILM, DISSOLVE 2 FILMS UNDER THE TONGUE ONCE DAILY, Disp: 60 Each, Rfl: 0    citalopram (CELEXA) 20 MG Tab, Take 1 Tablet by mouth every day. For anxiety, Disp: 90 Tablet, Rfl: 3    hydrOXYzine HCl (ATARAX) 25 MG Tab, Take 1 Tablet by mouth 3 times a day as needed for Itching., Disp: 30 Tablet, Rfl: 0    Apremilast (OTEZLA) 30 MG Tab, Take 30 mg by mouth 2 times a day. (Patient not taking: Reported on 3/25/2025), Disp: 60 Tablet, Rfl: 3    OTEZLA 10 & 20 & 30 MG Tablet Therapy Pack, FOLLOW PACKAGE DIRECTIONS (Patient not taking: Reported on 12/12/2024), Disp: , Rfl:     clobetasol (TEMOVATE) 0.05 % Ointment, , Disp: , Rfl:     Patient was instructed on the use of medications, either prescriptions or OTC and informed on when the appropriate follow up time period should be. In addition, patient was also instructed that should any acute worsening occur that they should notify this clinic asap or call 911.        Review of Systems:  Review of Systems   Constitutional: Negative.  Negative for chills and fever.   HENT: Negative.  Negative for hearing loss.    Eyes: Negative.  Negative for blurred vision and double vision.   Respiratory: Negative.  Negative for cough and hemoptysis.    Cardiovascular: Negative.  Negative for chest pain and palpitations.   Gastrointestinal: Negative.  Negative for heartburn and nausea.   Genitourinary: Negative.  Negative for dysuria.   Musculoskeletal: Negative.  Negative for myalgias.   Skin: Negative.  Negative for rash.   Neurological: Negative.   Negative for dizziness, tingling and headaches.   Endo/Heme/Allergies: Negative.  Does not bruise/bleed easily.   Psychiatric/Behavioral: Negative.  Negative for depression and suicidal ideas.    All other systems reviewed and are negative.      Physical Exam:  Physical Exam  Vitals reviewed.   Constitutional:       Appearance: Normal appearance.   HENT:      Head: Normocephalic.   Eyes:      Extraocular Movements: Extraocular movements intact.   Neurological:      Mental Status: He is alert and oriented to person, place, and time.   Psychiatric:         Mood and Affect: Mood normal.         Thought Content: Thought content normal.         Judgment: Judgment normal.         Labs:  No results found for this or any previous visit (from the past 24 hours).    Hemodynamics:  No data recorded.     No data recorded        Respiratory:                Fluids:  No intake or output data in the 24 hours ending 03/25/25 1501     GI/Nutrition:  No orders of the defined types were placed in this encounter.    Medications:  Current Outpatient Medications   Medication    dapsone 25 MG Tab    Buprenorphine HCl-Naloxone HCl 8-2 MG FILM    citalopram (CELEXA) 20 MG Tab    hydrOXYzine HCl (ATARAX) 25 MG Tab    Apremilast (OTEZLA) 30 MG Tab    OTEZLA 10 & 20 & 30 MG Tablet Therapy Pack    clobetasol (TEMOVATE) 0.05 % Ointment     No current facility-administered medications for this encounter.     Medical Decision Making, by Problem:  There are no active hospital problems to display for this patient.    Plan:    1. Uncomplicated opioid dependence (HCC)

## 2025-04-01 RX ORDER — CITALOPRAM HYDROBROMIDE 20 MG/1
20 TABLET ORAL DAILY
Qty: 90 TABLET | Refills: 3 | Status: SHIPPED | OUTPATIENT
Start: 2025-04-01

## 2025-04-01 NOTE — TELEPHONE ENCOUNTER
Received request via: Patient    Was the patient seen in the last year in this department? Yes    Does the patient have an active prescription (recently filled or refills available) for medication(s) requested? No    Pharmacy Name: [facility]     Does the patient have nursing home Plus and need 100-day supply? (This applies to ALL medications) Patient does not have SCP

## 2025-04-02 DIAGNOSIS — F11.20 UNCOMPLICATED OPIOID DEPENDENCE (HCC): ICD-10-CM

## 2025-04-02 NOTE — TELEPHONE ENCOUNTER
Received request via: Pharmacy     Was the patient seen in the last year in this department? Yes    Does the patient have an active prescription (recently filled or refills available) for medication(s) requested? No    Pharmacy Name: [facility] [place]     Does the patient have [facility] Plus and need 100-day supply? (This applies to ALL medications) Patient does not have SCP

## 2025-04-03 RX ORDER — BUPRENORPHINE AND NALOXONE 8; 2 MG/1; MG/1
FILM, SOLUBLE BUCCAL; SUBLINGUAL
Qty: 60 EACH | Refills: 0 | Status: SHIPPED | OUTPATIENT
Start: 2025-04-03 | End: 2025-05-03

## 2025-04-22 ENCOUNTER — APPOINTMENT (OUTPATIENT)
Dept: URBAN - NONMETROPOLITAN AREA CLINIC 15 | Facility: CLINIC | Age: 45
Setting detail: DERMATOLOGY
End: 2025-04-22

## 2025-04-22 DIAGNOSIS — Z79.899 OTHER LONG TERM (CURRENT) DRUG THERAPY: ICD-10-CM

## 2025-04-22 DIAGNOSIS — L13.0 DERMATITIS HERPETIFORMIS: ICD-10-CM | Status: WELL CONTROLLED

## 2025-04-22 PROCEDURE — ? ADDITIONAL NOTES

## 2025-04-22 PROCEDURE — ? ORDER TESTS

## 2025-04-22 PROCEDURE — ? COUNSELING

## 2025-04-22 PROCEDURE — ? PRESCRIPTION

## 2025-04-22 PROCEDURE — 99213 OFFICE O/P EST LOW 20 MIN: CPT

## 2025-04-22 PROCEDURE — ? HIGH RISK MEDICATION MONITORING

## 2025-04-22 RX ORDER — DAPSONE 25 MG/1
TABLET ORAL QD
Qty: 60 | Refills: 3 | Status: ERX | COMMUNITY
Start: 2025-04-22

## 2025-04-22 RX ADMIN — DAPSONE: 25 TABLET ORAL at 00:00

## 2025-04-22 ASSESSMENT — LOCATION DETAILED DESCRIPTION DERM
LOCATION DETAILED: LEFT ANTERIOR PROXIMAL THIGH
LOCATION DETAILED: LEFT MEDIAL INFERIOR CHEST
LOCATION DETAILED: LEFT INFERIOR MEDIAL LOWER BACK

## 2025-04-22 ASSESSMENT — LOCATION SIMPLE DESCRIPTION DERM
LOCATION SIMPLE: LEFT LOWER BACK
LOCATION SIMPLE: CHEST
LOCATION SIMPLE: LEFT THIGH

## 2025-04-22 ASSESSMENT — SEVERITY ASSESSMENT: SEVERITY: CLEAR

## 2025-04-22 ASSESSMENT — LOCATION ZONE DERM
LOCATION ZONE: TRUNK
LOCATION ZONE: LEG

## 2025-04-22 ASSESSMENT — BSA RASH: BSA RASH: 0

## 2025-04-22 NOTE — PROCEDURE: HIGH RISK MEDICATION MONITORING
Rifampin Pregnancy And Lactation Text: This medication is Pregnancy Category C and it isn't know if it is safe during pregnancy. It is also excreted in breast milk and should not be used if you are breast feeding.
Taltz Counseling: I discussed with the patient the risks of ixekizumab including but not limited to immunosuppression, serious infections, worsening of inflammatory bowel disease and drug reactions.  The patient understands that monitoring is required including a PPD at baseline and must alert us or the primary physician if symptoms of infection or other concerning signs are noted.
Elidel Counseling: Patient may experience a mild burning sensation during topical application. Elidel is not approved in children less than 2 years of age. There have been case reports of hematologic and skin malignancies in patients using topical calcineurin inhibitors although causality is questionable.
Minoxidil Pregnancy And Lactation Text: This medication has not been assigned a Pregnancy Risk Category but animal studies failed to show danger with the topical medication. It is unknown if the medication is excreted in breast milk.
Simlandi Pregnancy And Lactation Text: This medication is Pregnancy Category B and is considered safe during pregnancy. It is unknown if this medication is excreted in breast milk.
Xeljanz Counseling: I discussed with the patient the risks of Xeljanz therapy including increased risk of infection, liver issues, headache, diarrhea, or cold symptoms. Live vaccines should be avoided. They were instructed to call if they have any problems.
Zyclara Pregnancy And Lactation Text: This medication is Pregnancy Category C. It is unknown if this medication is excreted in breast milk.
Siliq Pregnancy And Lactation Text: The risk during pregnancy and breastfeeding is uncertain with this medication.
Benzoyl Peroxide Pregnancy And Lactation Text: This medication is Pregnancy Category C. It is unknown if benzoyl peroxide is excreted in breast milk.
Ilumya Counseling: I discussed with the patient the risks of tildrakizumab including but not limited to immunosuppression, malignancy, posterior leukoencephalopathy syndrome, and serious infections.  The patient understands that monitoring is required including a PPD at baseline and must alert us or the primary physician if symptoms of infection or other concerning signs are noted.
Oxybutynin Counseling:  I discussed with the patient the risks of oxybutynin including but not limited to skin rash, drowsiness, dry mouth, difficulty urinating, and blurred vision.
Dupixent Pregnancy And Lactation Text: This medication likely crosses the placenta but the risk for the fetus is uncertain. This medication is excreted in breast milk.
Tranexamic Acid Pregnancy And Lactation Text: It is unknown if this medication is safe during pregnancy or breast feeding.
Adbry Counseling: I discussed with the patient the risks of tralokinumab including but not limited to eye infection and irritation, cold sores, injection site reactions, worsening of asthma, allergic reactions and increased risk of parasitic infection.  Live vaccines should be avoided while taking tralokinumab. The patient understands that monitoring is required and they must alert us or the primary physician if symptoms of infection or other concerning signs are noted.
Doxepin Counseling:  Patient advised that the medication is sedating and not to drive a car after taking this medication. Patient informed of potential adverse effects including but not limited to dry mouth, urinary retention, and blurry vision.  The patient verbalized understanding of the proper use and possible adverse effects of doxepin.  All of the patient's questions and concerns were addressed.
Finasteride Female Counseling: Finasteride Counseling:  I discussed with the patient the risks of use of finasteride including but not limited to decreased libido and sexual dysfunction. Explained the teratogenic nature of the medication and stressed the importance of not getting pregnant during treatment. All of the patient's questions and concerns were addressed.
Colchicine Pregnancy And Lactation Text: This medication is Pregnancy Category C and isn't considered safe during pregnancy. It is excreted in breast milk.
Itraconazole Counseling:  I discussed with the patient the risks of itraconazole including but not limited to liver damage, nausea/vomiting, neuropathy, and severe allergy.  The patient understands that this medication is best absorbed when taken with acidic beverages such as non-diet cola or ginger ale.  The patient understands that monitoring is required including baseline LFTs and repeat LFTs at intervals.  The patient understands that they are to contact us or the primary physician if concerning signs are noted.
Topical Ketoconazole Counseling: Patient counseled that this medication may cause skin irritation or allergic reactions.  In the event of skin irritation, the patient was advised to reduce the amount of the drug applied or use it less frequently.   The patient verbalized understanding of the proper use and possible adverse effects of ketoconazole.  All of the patient's questions and concerns were addressed.
Niacinamide Pregnancy And Lactation Text: These medications are considered safe during pregnancy.
Libtayo Pregnancy And Lactation Text: This medication is contraindicated in pregnancy and when breast feeding.
Cephalexin Counseling: I counseled the patient regarding use of cephalexin as an antibiotic for prophylactic and/or therapeutic purposes. Cephalexin (commonly prescribed under brand name Keflex) is a cephalosporin antibiotic which is active against numerous classes of bacteria, including most skin bacteria. Side effects may include nausea, diarrhea, gastrointestinal upset, rash, hives, yeast infections, and in rare cases, hepatitis, kidney disease, seizures, fever, confusion, neurologic symptoms, and others. Patients with severe allergies to penicillin medications are cautioned that there is about a 10% incidence of cross-reactivity with cephalosporins. When possible, patients with penicillin allergies should use alternatives to cephalosporins for antibiotic therapy.
Xelstefanyz Pregnancy And Lactation Text: This medication is Pregnancy Category D and is not considered safe during pregnancy.  The risk during breast feeding is also uncertain.
Include Pregnancy/Lactation Warning?: No
Simponi Counseling:  I discussed with the patient the risks of golimumab including but not limited to myelosuppression, immunosuppression, autoimmune hepatitis, demyelinating diseases, lymphoma, and serious infections.  The patient understands that monitoring is required including a PPD at baseline and must alert us or the primary physician if symptoms of infection or other concerning signs are noted.
Mirvaso Counseling: Mirvaso is a topical medication which can decrease superficial blood flow where applied. Side effects are uncommon and include stinging, redness and allergic reactions.
Cellcept Pregnancy And Lactation Text: This medication is Pregnancy Category D and isn't considered safe during pregnancy. It is unknown if this medication is excreted in breast milk.
Isotretinoin Pregnancy And Lactation Text: This medication is Pregnancy Category X and is considered extremely dangerous during pregnancy. It is unknown if it is excreted in breast milk.
Rhofade Pregnancy And Lactation Text: This medication has not been assigned a Pregnancy Risk Category. It is unknown if the medication is excreted in breast milk.
Finasteride Pregnancy And Lactation Text: This medication is absolutely contraindicated during pregnancy. It is unknown if it is excreted in breast milk.
Valtrex Counseling: I discussed with the patient the risks of valacyclovir including but not limited to kidney damage, nausea, vomiting and severe allergy.  The patient understands that if the infection seems to be worsening or is not improving, they are to call.
Ebglyss Counseling: I discussed with the patient the risks of lebrikizumab including but not limited to eye inflammation and irritation, cold sores, injection site reactions, allergic reactions and increased risk of parasitic infection. The patient understands that monitoring is required and they must alert us or the primary physician if symptoms of infection or other concerning signs are noted.
Adbry Pregnancy And Lactation Text: It is unknown if this medication will adversely affect pregnancy or breast feeding.
Erythromycin Pregnancy And Lactation Text: This medication is Pregnancy Category B and is considered safe during pregnancy. It is also excreted in breast milk.
Dapsone Counseling: I discussed with the patient the risks of dapsone including but not limited to hemolytic anemia, agranulocytosis, rashes, methemoglobinemia, kidney failure, peripheral neuropathy, headaches, GI upset, and liver toxicity.  Patients who start dapsone require monitoring including baseline LFTs and weekly CBCs for the first month, then every month thereafter.  The patient verbalized understanding of the proper use and possible adverse effects of dapsone.  All of the patient's questions and concerns were addressed.
Doxepin Pregnancy And Lactation Text: This medication is Pregnancy Category C and it isn't known if it is safe during pregnancy. It is also excreted in breast milk and breast feeding isn't recommended.
Sarecycline Counseling: Patient advised regarding possible photosensitivity and discoloration of the teeth, skin, lips, tongue and gums.  Patient instructed to avoid sunlight, if possible.  When exposed to sunlight, patients should wear protective clothing, sunglasses, and sunscreen.  The patient was instructed to call the office immediately if the following severe adverse effects occur:  hearing changes, easy bruising/bleeding, severe headache, or vision changes.  The patient verbalized understanding of the proper use and possible adverse effects of sarecycline.  All of the patient's questions and concerns were addressed.
Odomzo Counseling- I discussed with the patient the risks of Odomzo including but not limited to nausea, vomiting, diarrhea, constipation, weight loss, changes in the sense of taste, decreased appetite, muscle spasms, and hair loss.  The patient verbalized understanding of the proper use and possible adverse effects of Odomzo.  All of the patient's questions and concerns were addressed.
Itraconazole Pregnancy And Lactation Text: This medication is Pregnancy Category C and it isn't know if it is safe during pregnancy. It is also excreted in breast milk.
Topical Ketoconazole Pregnancy And Lactation Text: This medication is Pregnancy Category B and is considered safe during pregnancy. It is unknown if it is excreted in breast milk.
Nsaids Counseling: NSAID Counseling: I discussed with the patient that NSAIDs should be taken with food. Prolonged use of NSAIDs can result in the development of stomach ulcers.  Patient advised to stop taking NSAIDs if abdominal pain occurs.  The patient verbalized understanding of the proper use and possible adverse effects of NSAIDs.  All of the patient's questions and concerns were addressed.
Carac Counseling:  I discussed with the patient the risks of Carac including but not limited to erythema, scaling, itching, weeping, crusting, and pain.
Cibinqo Counseling: I discussed with the patient the risks of Cibinqo therapy including but not limited to common cold, nausea, headache, cold sores, increased blood CPK levels, dizziness, UTIs, fatigue, acne, and vomitting. Live vaccines should be avoided.  This medication has been linked to serious infections; higher rate of mortality; malignancy and lymphoproliferative disorders; major adverse cardiovascular events; thrombosis; thrombocytopenia and lymphopenia; lipid elevations; and retinal detachment.
Simlandi Counseling:  I discussed with the patient the risks of adalimumab including but not limited to myelosuppression, immunosuppression, autoimmune hepatitis, demyelinating diseases, lymphoma, and serious infections.  The patient understands that monitoring is required including a PPD at baseline and must alert us or the primary physician if symptoms of infection or other concerning signs are noted.
Opioid Counseling: I discussed with the patient the potential side effects of opioids including but not limited to addiction, altered mental status, and depression. I stressed avoiding alcohol, benzodiazepines, muscle relaxants and sleep aids unless specifically okayed by a physician. The patient verbalized understanding of the proper use and possible adverse effects of opioids. All of the patient's questions and concerns were addressed. They were instructed to flush the remaining pills down the toilet if they did not need them for pain.
Cyclophosphamide Counseling:  I discussed with the patient the risks of cyclophosphamide including but not limited to hair loss, hormonal abnormalities, decreased fertility, abdominal pain, diarrhea, nausea and vomiting, bone marrow suppression and infection. The patient understands that monitoring is required while taking this medication.
Solaraze Counseling:  I discussed with the patient the risks of Solaraze including but not limited to erythema, scaling, itching, weeping, crusting, and pain.
Bimzelx Counseling:  I discussed with the patient the risks of Bimzelx including but not limited to depression, immunosuppression, allergic reactions and infections.  The patient understands that monitoring is required including a PPD at baseline and must alert us or the primary physician if symptoms of infection or other concerning signs are noted.
Dapsone Pregnancy And Lactation Text: This medication is Pregnancy Category C and is not considered safe during pregnancy or breast feeding.
Hydroxyzine Counseling: Patient advised that the medication is sedating and not to drive a car after taking this medication.  Patient informed of potential adverse effects including but not limited to dry mouth, urinary retention, and blurry vision.  The patient verbalized understanding of the proper use and possible adverse effects of hydroxyzine.  All of the patient's questions and concerns were addressed.
Tremfya Counseling: I discussed with the patient the risks of guselkumab including but not limited to immunosuppression, serious infections, worsening of inflammatory bowel disease and drug reactions.  The patient understands that monitoring is required including a PPD at baseline and must alert us or the primary physician if symptoms of infection or other concerning signs are noted.
Valtrex Pregnancy And Lactation Text: this medication is Pregnancy Category B and is considered safe during pregnancy. This medication is not directly found in breast milk but it's metabolite acyclovir is present.
Cephalexin Pregnancy And Lactation Text: This medication is Pregnancy Category B and considered safe during pregnancy.  It is also excreted in breast milk but can be used safely for shorter doses.
Birth Control Pills Counseling: Birth Control Pill Counseling: I discussed with the patient the potential side effects of OCPs including but not limited to increased risk of stroke, heart attack, thrombophlebitis, deep venous thrombosis, hepatic adenomas, breast changes, GI upset, headaches, and depression.  The patient verbalized understanding of the proper use and possible adverse effects of OCPs. All of the patient's questions and concerns were addressed.
Eucrisa Counseling: Patient may experience a mild burning sensation during topical application. Eucrisa is not approved in children less than 2 years of age.
Infliximab Counseling:  I discussed with the patient the risks of infliximab including but not limited to myelosuppression, immunosuppression, autoimmune hepatitis, demyelinating diseases, lymphoma, and serious infections.  The patient understands that monitoring is required including a PPD at baseline and must alert us or the primary physician if symptoms of infection or other concerning signs are noted.
Metronidazole Counseling:  I discussed with the patient the risks of metronidazole including but not limited to seizures, nausea/vomiting, a metallic taste in the mouth, nausea/vomiting and severe allergy.
Propranolol Counseling:  I discussed with the patient the risks of propranolol including but not limited to low heart rate, low blood pressure, low blood sugar, restlessness and increased cold sensitivity. They should call the office if they experience any of these side effects.
Ebglyss Pregnancy And Lactation Text: This medication likely crosses the placenta but the risk for the fetus is uncertain. It is unknown if this medication is excreted in breast milk.
Cibinqo Pregnancy And Lactation Text: It is unknown if this medication will adversely affect pregnancy or breast feeding.  You should not take this medication if you are currently pregnant or planning a pregnancy or while breastfeeding.
Sarecycline Pregnancy And Lactation Text: This medication is Pregnancy Category D and not consider safe during pregnancy. It is also excreted in breast milk.
Odomzo Pregnancy And Lactation Text: This medication is Pregnancy Category X and is absolutely contraindicated during pregnancy. It is unknown if it is excreted in breast milk.
Glycopyrrolate Counseling:  I discussed with the patient the risks of glycopyrrolate including but not limited to skin rash, drowsiness, dry mouth, difficulty urinating, and blurred vision.
Solaraze Pregnancy And Lactation Text: This medication is Pregnancy Category B and is considered safe. There is some data to suggest avoiding during the third trimester. It is unknown if this medication is excreted in breast milk.
Nsaids Pregnancy And Lactation Text: These medications are considered safe up to 30 weeks gestation. It is excreted in breast milk.
Carac Pregnancy And Lactation Text: This medication is Pregnancy Category X and contraindicated in pregnancy and in women who may become pregnant. It is unknown if this medication is excreted in breast milk.
Sotyktu Counseling:  I discussed the most common side effects of Sotyktu including: common cold, sore throat, sinus infections, cold sores, canker sores, folliculitis, and acne.  I also discussed more serious side effects of Sotyktu including but not limited to: serious allergic reactions; increased risk for infections such as TB; cancers such as lymphomas; rhabdomyolysis and elevated CPK; and elevated triglycerides and liver enzymes. 
Topical Sulfur Applications Counseling: Topical Sulfur Counseling: Patient counseled that this medication may cause skin irritation or allergic reactions.  In the event of skin irritation, the patient was advised to reduce the amount of the drug applied or use it less frequently.   The patient verbalized understanding of the proper use and possible adverse effects of topical sulfur application.  All of the patient's questions and concerns were addressed.
Cyclophosphamide Pregnancy And Lactation Text: This medication is Pregnancy Category D and it isn't considered safe during pregnancy. This medication is excreted in breast milk.
Clindamycin Counseling: I counseled the patient regarding use of clindamycin as an antibiotic for prophylactic and/or therapeutic purposes. Clindamycin is active against numerous classes of bacteria, including skin bacteria. Side effects may include nausea, diarrhea, gastrointestinal upset, rash, hives, yeast infections, and in rare cases, colitis.
Ketoconazole Counseling:   Patient counseled regarding improving absorption with orange juice.  Adverse effects include but are not limited to breast enlargement, headache, diarrhea, nausea, upset stomach, liver function test abnormalities, taste disturbance, and stomach pain.  There is a rare possibility of liver failure that can occur when taking ketoconazole. The patient understands that monitoring of LFTs may be required, especially at baseline. The patient verbalized understanding of the proper use and possible adverse effects of ketoconazole.  All of the patient's questions and concerns were addressed.
Opioid Pregnancy And Lactation Text: These medications can lead to premature delivery and should be avoided during pregnancy. These medications are also present in breast milk in small amounts.
Hydroxyzine Pregnancy And Lactation Text: This medication is not safe during pregnancy and should not be taken. It is also excreted in breast milk and breast feeding isn't recommended.
Litfulo Counseling: Litfulo (Ritlecitinib) Counseling: I discussed with the patient the risks of Litfulo therapy including but not limited to headache, upper respiratory infections, nausea, diarrhea, acne, and urticaria. Live vaccines should be avoided.  This medication has been linked to serious infections; higher rate of mortality; malignancy and lymphoproliferative disorders; major adverse cardiovascular events; thrombosis; decreases in lymphocytes and platelets; liver enzyme elevations; and CPK elevations.
Bimzelx Pregnancy And Lactation Text: This medication crosses the placenta and the safety is uncertain during pregnancy. It is unknown if this medication is present in breast milk.
Tetracycline Counseling: Patient counseled regarding possible photosensitivity and increased risk for sunburn.  Patient instructed to avoid sunlight, if possible.  When exposed to sunlight, patients should wear protective clothing, sunglasses, and sunscreen.  The patient was instructed to call the office immediately if the following severe adverse effects occur:  hearing changes, easy bruising/bleeding, severe headache, or vision changes.  The patient verbalized understanding of the proper use and possible adverse effects of tetracycline.  All of the patient's questions and concerns were addressed. Patient understands to avoid pregnancy while on therapy due to potential birth defects.
Propranolol Pregnancy And Lactation Text: This medication is Pregnancy Category C and it isn't known if it is safe during pregnancy. It is excreted in breast milk.
Skyrizi Counseling: I discussed with the patient the risks of risankizumab-rzaa including but not limited to immunosuppression, and serious infections.  The patient understands that monitoring is required including a PPD at baseline and must alert us or the primary physician if symptoms of infection or other concerning signs are noted.
Metronidazole Pregnancy And Lactation Text: This medication is Pregnancy Category B and considered safe during pregnancy.  It is also excreted in breast milk.
Opzelura Counseling:  I discussed with the patient the risks of Opzelura including but not limited to nasopharngitis, bronchitis, ear infection, eosinophila, hives, diarrhea, folliculitis, tonsillitis, and rhinorrhea.  Taken orally, this medication has been linked to serious infections; higher rate of mortality; malignancy and lymphoproliferative disorders; major adverse cardiovascular events; thrombosis; thrombocytopenia, anemia, and neutropenia; and lipid elevations.
Gabapentin Counseling: I discussed with the patient the risks of gabapentin including but not limited to dizziness, somnolence, fatigue and ataxia.
Topical Retinoid counseling:  Patient advised to apply a pea-sized amount only at bedtime and wait 30 minutes after washing their face before applying.  If too drying, patient may add a non-comedogenic moisturizer. The patient verbalized understanding of the proper use and possible adverse effects of retinoids.  All of the patient's questions and concerns were addressed.
Sotyktu Pregnancy And Lactation Text: There is insufficient data to evaluate whether or not Sotyktu is safe to use during pregnancy.   It is not known if Sotyktu passes into breast milk and whether or not it is safe to use when breastfeeding.  
Birth Control Pills Pregnancy And Lactation Text: This medication should be avoided if pregnant and for the first 30 days post-partum.
Calcipotriene Counseling:  I discussed with the patient the risks of calcipotriene including but not limited to erythema, scaling, itching, and irritation.
Topical Sulfur Applications Pregnancy And Lactation Text: This medication is Pregnancy Category C and has an unknown safety profile during pregnancy. It is unknown if this topical medication is excreted in breast milk.
Enbrel Counseling:  I discussed with the patient the risks of etanercept including but not limited to myelosuppression, immunosuppression, autoimmune hepatitis, demyelinating diseases, lymphoma, and infections.  The patient understands that monitoring is required including a PPD at baseline and must alert us or the primary physician if symptoms of infection or other concerning signs are noted.
Cyclosporine Counseling:  I discussed with the patient the risks of cyclosporine including but not limited to hypertension, gingival hyperplasia,myelosuppression, immunosuppression, liver damage, kidney damage, neurotoxicity, lymphoma, and serious infections. The patient understands that monitoring is required including baseline blood pressure, CBC, CMP, lipid panel and uric acid, and then 1-2 times monthly CMP and blood pressure.
Glycopyrrolate Pregnancy And Lactation Text: This medication is Pregnancy Category B and is considered safe during pregnancy. It is unknown if it is excreted breast milk.
Ketoconazole Pregnancy And Lactation Text: This medication is Pregnancy Category C and it isn't know if it is safe during pregnancy. It is also excreted in breast milk and breast feeding isn't recommended.
Olanzapine Counseling- I discussed with the patient the common side effects of olanzapine including but are not limited to: lack of energy, dry mouth, increased appetite, sleepiness, tremor, constipation, dizziness, changes in behavior, or restlessness.  Explained that teenagers are more likely to experience headaches, abdominal pain, pain in the arms or legs, tiredness, and sleepiness.  Serious side effects include but are not limited: increased risk of death in elderly patients who are confused, have memory loss, or dementia-related psychosis; hyperglycemia; increased cholesterol and triglycerides; and weight gain.
Xolair Counseling:  Patient informed of potential adverse effects including but not limited to fever, muscle aches, rash and allergic reactions.  The patient verbalized understanding of the proper use and possible adverse effects of Xolair.  All of the patient's questions and concerns were addressed.
Dutasteride Male Counseling: Dustasteride Counseling:  I discussed with the patient the risks of use of dutasteride including but not limited to decreased libido, decreased ejaculate volume, and gynecomastia. Women who can become pregnant should not handle medication.  All of the patient's questions and concerns were addressed.
Litfulo Pregnancy And Lactation Text: There is insufficient data to evaluate whether or not Litfulo is safe to use during pregnancy.  Breastfeeding is not recommended during treatment.
High Dose Vitamin A Counseling: Side effects reviewed, pt to contact office should one occur.
Clindamycin Pregnancy And Lactation Text: This medication can be used in pregnancy if certain situations. Clindamycin is also present in breast milk.
Opzelura Pregnancy And Lactation Text: There is insufficient data to evaluate drug-associated risk for major birth defects, miscarriage, or other adverse maternal or fetal outcomes.  There is a pregnancy registry that monitors pregnancy outcomes in pregnant persons exposed to the medication during pregnancy.  It is unknown if this medication is excreted in breast milk.  Do not breastfeed during treatment and for about 4 weeks after the last dose.
SSKI Counseling:  I discussed with the patient the risks of SSKI including but not limited to thyroid abnormalities, metallic taste, GI upset, fever, headache, acne, arthralgias, paraesthesias, lymphadenopathy, easy bleeding, arrhythmias, and allergic reaction.
Nemluvio Counseling: I discussed with the patient the risks of nemolizumab including but not limited to headache, gastrointestinal complaints, nasopharyngitis, musculoskeletal complaints, injection site reactions, and allergic reactions. The patient understands that monitoring is required and they must alert us or the primary physician if any side effects are noted.
Calcipotriene Pregnancy And Lactation Text: This medication has not been proven safe during pregnancy. It is unknown if this medication is excreted in breast milk.
Cimzia Counseling:  I discussed with the patient the risks of Cimzia including but not limited to immunosuppression, allergic reactions and infections.  The patient understands that monitoring is required including a PPD at baseline and must alert us or the primary physician if symptoms of infection or other concerning signs are noted.
Minocycline Counseling: Patient advised regarding possible photosensitivity and discoloration of the teeth, skin, lips, tongue and gums.  Patient instructed to avoid sunlight, if possible.  When exposed to sunlight, patients should wear protective clothing, sunglasses, and sunscreen.  The patient was instructed to call the office immediately if the following severe adverse effects occur:  hearing changes, easy bruising/bleeding, severe headache, or vision changes.  The patient verbalized understanding of the proper use and possible adverse effects of minocycline.  All of the patient's questions and concerns were addressed.
Hydroquinone Counseling:  Patient advised that medication may result in skin irritation, lightening (hypopigmentation), dryness, and burning.  In the event of skin irritation, the patient was advised to reduce the amount of the drug applied or use it less frequently.  Rarely, spots that are treated with hydroquinone can become darker (pseudoochronosis).  Should this occur, patient instructed to stop medication and call the office. The patient verbalized understanding of the proper use and possible adverse effects of hydroquinone.  All of the patient's questions and concerns were addressed.
Spironolactone Counseling: Patient advised regarding risks of diarrhea, abdominal pain, hyperkalemia, birth defects (for female patients), liver toxicity and renal toxicity. The patient may need blood work to monitor liver and kidney function and potassium levels while on therapy. The patient verbalized understanding of the proper use and possible adverse effects of spironolactone.  All of the patient's questions and concerns were addressed.
Arava Counseling:  Patient counseled regarding adverse effects of Arava including but not limited to nausea, vomiting, abnormalities in liver function tests. Patients may develop mouth sores, rash, diarrhea, and abnormalities in blood counts. The patient understands that monitoring is required including LFTs and blood counts.  There is a rare possibility of scarring of the liver and lung problems that can occur when taking methotrexate. Persistent nausea, loss of appetite, pale stools, dark urine, cough, and shortness of breath should be reported immediately. Patient advised to discontinue Arava treatment and consult with a physician prior to attempting conception. The patient will have to undergo a treatment to eliminate Arava from the body prior to conception.
Albendazole Counseling:  I discussed with the patient the risks of albendazole including but not limited to cytopenia, kidney damage, nausea/vomiting and severe allergy.  The patient understands that this medication is being used in an off-label manner.
Olanzapine Pregnancy And Lactation Text: This medication is pregnancy category C.   There are no adequate and well controlled trials with olanzapine in pregnant females.  Olanzapine should be used during pregnancy only if the potential benefit justifies the potential risk to the fetus.   In a study in lactating healthy women, olanzapine was excreted in breast milk.  It is recommended that women taking olanzapine should not breast feed.
Terbinafine Counseling: Patient counseling regarding adverse effects of terbinafine including but not limited to headache, diarrhea, rash, upset stomach, liver function test abnormalities, itching, taste/smell disturbance, nausea, abdominal pain, and flatulence.  There is a rare possibility of liver failure that can occur when taking terbinafine.  The patient understands that a baseline LFT and kidney function test may be required. The patient verbalized understanding of the proper use and possible adverse effects of terbinafine.  All of the patient's questions and concerns were addressed.
Wartpeel Counseling:  I discussed with the patient the risks of Wartpeel including but not limited to erythema, scaling, itching, weeping, crusting, and pain.
Acitretin Counseling:  I discussed with the patient the risks of acitretin including but not limited to hair loss, dry lips/skin/eyes, liver damage, hyperlipidemia, depression/suicidal ideation, photosensitivity.  Serious rare side effects can include but are not limited to pancreatitis, pseudotumor cerebri, bony changes, clot formation/stroke/heart attack.  Patient understands that alcohol is contraindicated since it can result in liver toxicity and significantly prolong the elimination of the drug by many years.
Picato Counseling:  I discussed with the patient the risks of Picato including but not limited to erythema, scaling, itching, weeping, crusting, and pain.
Doxycycline Counseling:  Patient counseled regarding possible photosensitivity and increased risk for sunburn.  Patient instructed to avoid sunlight, if possible.  When exposed to sunlight, patients should wear protective clothing, sunglasses, and sunscreen.  The patient was instructed to call the office immediately if the following severe adverse effects occur:  hearing changes, easy bruising/bleeding, severe headache, or vision changes.  The patient verbalized understanding of the proper use and possible adverse effects of doxycycline.  All of the patient's questions and concerns were addressed.
Xolair Pregnancy And Lactation Text: This medication is Pregnancy Category B and is considered safe during pregnancy. This medication is excreted in breast milk.
Hydroxychloroquine Counseling:  I discussed with the patient that a baseline ophthalmologic exam is needed at the start of therapy and every year thereafter while on therapy. A CBC may also be warranted for monitoring.  The side effects of this medication were discussed with the patient, including but not limited to agranulocytosis, aplastic anemia, seizures, rashes, retinopathy, and liver toxicity. Patient instructed to call the office should any adverse effect occur.  The patient verbalized understanding of the proper use and possible adverse effects of Plaquenil.  All the patient's questions and concerns were addressed.
Cyclosporine Pregnancy And Lactation Text: This medication is Pregnancy Category C and it isn't know if it is safe during pregnancy. This medication is excreted in breast milk.
Nemluvio Pregnancy And Lactation Text: It is not known if Nemluvio causes fetal harm or is present in breast milk. Please proceed with caution if patients who are pregnant or breastfeeding.
5-Fu Counseling: 5-Fluorouracil Counseling:  I discussed with the patient the risks of 5-fluorouracil including but not limited to erythema, scaling, itching, weeping, crusting, and pain.
Humira Counseling:  I discussed with the patient the risks of adalimumab including but not limited to myelosuppression, immunosuppression, autoimmune hepatitis, demyelinating diseases, lymphoma, and serious infections.  The patient understands that monitoring is required including a PPD at baseline and must alert us or the primary physician if symptoms of infection or other concerning signs are noted.
Spironolactone Pregnancy And Lactation Text: This medication can cause feminization of the male fetus and should be avoided during pregnancy. The active metabolite is also found in breast milk.
Cimzia Pregnancy And Lactation Text: This medication crosses the placenta but can be considered safe in certain situations. Cimzia may be excreted in breast milk.
High Dose Vitamin A Pregnancy And Lactation Text: High dose vitamin A therapy is contraindicated during pregnancy and breast feeding.
Spevigo Counseling: I discussed with the patient the risks of Spevigo including but not limited to fatigue, nasuea, vomiting, headache, pruritus, urinary tract infection, an infusion related reactions.  The patient understands that monitoring is required including screening for tuberculosis at baseline and yearly screening thereafter while continuing Spevigo therapy. They should contact us if symptoms of infection or other concerning signs are noted.
Olumiant Counseling: I discussed with the patient the risks of Olumiant therapy including but not limited to upper respiratory tract infections, shingles, cold sores, and nausea. Live vaccines should be avoided.  This medication has been linked to serious infections; higher rate of mortality; malignancy and lymphoproliferative disorders; major adverse cardiovascular events; thrombosis; gastrointestinal perforations; neutropenia; lymphopenia; anemia; liver enzyme elevations; and lipid elevations.
Oral Minoxidil Counseling- I discussed with the patient the risks of oral minoxidil including but not limited to shortness of breath, swelling of the feet or ankles, dizziness, lightheadedness, unwanted hair growth and allergic reaction.  The patient verbalized understanding of the proper use and possible adverse effects of oral minoxidil.  All of the patient's questions and concerns were addressed.
Sski Pregnancy And Lactation Text: This medication is Pregnancy Category D and isn't considered safe during pregnancy. It is excreted in breast milk.
Methotrexate Counseling:  Patient counseled regarding adverse effects of methotrexate including but not limited to nausea, vomiting, abnormalities in liver function tests. Patients may develop mouth sores, rash, diarrhea, and abnormalities in blood counts. The patient understands that monitoring is required including LFT's and blood counts.  There is a rare possibility of scarring of the liver and lung problems that can occur when taking methotrexate. Persistent nausea, loss of appetite, pale stools, dark urine, cough, and shortness of breath should be reported immediately. Patient advised to discontinue methotrexate treatment at least three months before attempting to become pregnant.  I discussed the need for folate supplements while taking methotrexate.  These supplements can decrease side effects during methotrexate treatment. The patient verbalized understanding of the proper use and possible adverse effects of methotrexate.  All of the patient's questions and concerns were addressed.
Acitretin Pregnancy And Lactation Text: This medication is Pregnancy Category X and should not be given to women who are pregnant or may become pregnant in the future. This medication is excreted in breast milk.
Fluconazole Counseling:  Patient counseled regarding adverse effects of fluconazole including but not limited to headache, diarrhea, nausea, upset stomach, liver function test abnormalities, taste disturbance, and stomach pain.  There is a rare possibility of liver failure that can occur when taking fluconazole.  The patient understands that monitoring of LFTs and kidney function test may be required, especially at baseline. The patient verbalized understanding of the proper use and possible adverse effects of fluconazole.  All of the patient's questions and concerns were addressed.
Azithromycin Counseling:  I discussed with the patient the risks of azithromycin including but not limited to GI upset, allergic reaction, drug rash, diarrhea, and yeast infections.
Terbinafine Pregnancy And Lactation Text: This medication is Pregnancy Category B and is considered safe during pregnancy. It is also excreted in breast milk and breast feeding isn't recommended.
Tazorac Counseling:  Patient advised that medication is irritating and drying.  Patient may need to apply sparingly and wash off after an hour before eventually leaving it on overnight.  The patient verbalized understanding of the proper use and possible adverse effects of tazorac.  All of the patient's questions and concerns were addressed.
Albendazole Pregnancy And Lactation Text: This medication is Pregnancy Category C and it isn't known if it is safe during pregnancy. It is also excreted in breast milk.
Hydroxychloroquine Pregnancy And Lactation Text: This medication has been shown to cause fetal harm but it isn't assigned a Pregnancy Risk Category. There are small amounts excreted in breast milk.
Spevigo Pregnancy And Lactation Text: The risk during pregnancy and breastfeeding is uncertain with this medication. This medication does cross the placenta. It is unknown if this medication is found in breast milk.
Imiquimod Counseling:  I discussed with the patient the risks of imiquimod including but not limited to erythema, scaling, itching, weeping, crusting, and pain.  Patient understands that the inflammatory response to imiquimod is variable from person to person and was educated regarded proper titration schedule.  If flu-like symptoms develop, patient knows to discontinue the medication and contact us.
Doxycycline Pregnancy And Lactation Text: This medication is Pregnancy Category D and not consider safe during pregnancy. It is also excreted in breast milk but is considered safe for shorter treatment courses.
Dutasteride Female Counseling: Dutasteride Counseling:  I discussed with the patient the risks of use of dutasteride including but not limited to decreased libido and sexual dysfunction. Explained the teratogenic nature of the medication and stressed the importance of not getting pregnant during treatment. All of the patient's questions and concerns were addressed.
Thalidomide Counseling: I discussed with the patient the risks of thalidomide including but not limited to birth defects, anxiety, weakness, chest pain, dizziness, cough and severe allergy.
Cosentyx Counseling:  I discussed with the patient the risks of Cosentyx including but not limited to worsening of Crohn's disease, immunosuppression, allergic reactions and infections.  The patient understands that monitoring is required including a PPD at baseline and must alert us or the primary physician if symptoms of infection or other concerning signs are noted.
Clofazimine Counseling:  I discussed with the patient the risks of clofazimine including but not limited to skin and eye pigmentation, liver damage, nausea/vomiting, gastrointestinal bleeding and allergy.
Rituxan Counseling:  I discussed with the patient the risks of Rituxan infusions. Side effects can include infusion reactions, severe drug rashes including mucocutaneous reactions, reactivation of latent hepatitis and other infections and rarely progressive multifocal leukoencephalopathy.  All of the patient's questions and concerns were addressed.
Quinolones Counseling:  I discussed with the patient the risks of fluoroquinolones including but not limited to GI upset, allergic reaction, drug rash, diarrhea, dizziness, photosensitivity, yeast infections, liver function test abnormalities, tendonitis/tendon rupture.
Olumiant Pregnancy And Lactation Text: Based on animal studies, Olumiant may cause embryo-fetal harm when administered to pregnant women.  The medication should not be used in pregnancy.  Breastfeeding is not recommended during treatment.
Erivedge Counseling- I discussed with the patient the risks of Erivedge including but not limited to nausea, vomiting, diarrhea, constipation, weight loss, changes in the sense of taste, decreased appetite, muscle spasms, and hair loss.  The patient verbalized understanding of the proper use and possible adverse effects of Erivedge.  All of the patient's questions and concerns were addressed.
Tazorac Pregnancy And Lactation Text: This medication is not safe during pregnancy. It is unknown if this medication is excreted in breast milk.
Low Dose Naltrexone Counseling- I discussed with the patient the potential risks and side effects of low dose naltrexone including but not limited to: more vivid dreams, headaches, nausea, vomiting, abdominal pain, fatigue, dizziness, and anxiety.
Azelaic Acid Counseling: Patient counseled that medicine may cause skin irritation and to avoid applying near the eyes.  In the event of skin irritation, the patient was advised to reduce the amount of the drug applied or use it less frequently.   The patient verbalized understanding of the proper use and possible adverse effects of azelaic acid.  All of the patient's questions and concerns were addressed.
Oral Minoxidil Pregnancy And Lactation Text: This medication should only be used when clearly needed if you are pregnant, attempting to become pregnant or breast feeding.
Winlevi Counseling:  I discussed with the patient the risks of topical clascoterone including but not limited to erythema, scaling, itching, and stinging. Patient voiced their understanding.
Methotrexate Pregnancy And Lactation Text: This medication is Pregnancy Category X and is known to cause fetal harm. This medication is excreted in breast milk.
Azathioprine Counseling:  I discussed with the patient the risks of azathioprine including but not limited to myelosuppression, immunosuppression, hepatotoxicity, lymphoma, and infections.  The patient understands that monitoring is required including baseline LFTs, Creatinine, possible TPMP genotyping and weekly CBCs for the first month and then every 2 weeks thereafter.  The patient verbalized understanding of the proper use and possible adverse effects of azathioprine.  All of the patient's questions and concerns were addressed.
Azithromycin Pregnancy And Lactation Text: This medication is considered safe during pregnancy and is also secreted in breast milk.
Protopic Counseling: Patient may experience a mild burning sensation during topical application. Protopic is not approved in children less than 2 years of age. There have been case reports of hematologic and skin malignancies in patients using topical calcineurin inhibitors although causality is questionable.
Erythromycin Counseling:  I discussed with the patient the risks of erythromycin including but not limited to GI upset, allergic reaction, drug rash, diarrhea, increase in liver enzymes, and yeast infections.
Ivermectin Counseling:  Patient instructed to take medication on an empty stomach with a full glass of water.  Patient informed of potential adverse effects including but not limited to nausea, diarrhea, dizziness, itching, and swelling of the extremities or lymph nodes.  The patient verbalized understanding of the proper use and possible adverse effects of ivermectin.  All of the patient's questions and concerns were addressed.
Rinvoq Counseling: I discussed with the patient the risks of Rinvoq therapy including but not limited to upper respiratory tract infections, shingles, cold sores, bronchitis, nausea, cough, fever, acne, and headache. Live vaccines should be avoided.  This medication has been linked to serious infections; higher rate of mortality; malignancy and lymphoproliferative disorders; major adverse cardiovascular events; thrombosis; thrombocytopenia, anemia, and neutropenia; lipid elevations; liver enzyme elevations; and gastrointestinal perforations.
Cimetidine Counseling:  I discussed with the patient the risks of Cimetidine including but not limited to gynecomastia, headache, diarrhea, nausea, drowsiness, arrhythmias, pancreatitis, skin rashes, psychosis, bone marrow suppression and kidney toxicity.
Stelara Counseling:  I discussed with the patient the risks of ustekinumab including but not limited to immunosuppression, malignancy, posterior leukoencephalopathy syndrome, and serious infections.  The patient understands that monitoring is required including a PPD at baseline and must alert us or the primary physician if symptoms of infection or other concerning signs are noted.
Bexarotene Counseling:  I discussed with the patient the risks of bexarotene including but not limited to hair loss, dry lips/skin/eyes, liver abnormalities, hyperlipidemia, pancreatitis, depression/suicidal ideation, photosensitivity, drug rash/allergic reactions, hypothyroidism, anemia, leukopenia, infection, cataracts, and teratogenicity.  Patient understands that they will need regular blood tests to check lipid profile, liver function tests, white blood cell count, thyroid function tests and pregnancy test if applicable.
Azelaic Acid Pregnancy And Lactation Text: This medication is considered safe during pregnancy and breast feeding.
Drysol Counseling:  I discussed with the patient the risks of drysol/aluminum chloride including but not limited to skin rash, itching, irritation, burning.
Winlevi Pregnancy And Lactation Text: This medication is considered safe during pregnancy and breastfeeding.
Rituxan Pregnancy And Lactation Text: This medication is Pregnancy Category C and it isn't know if it is safe during pregnancy. It is unknown if this medication is excreted in breast milk but similar antibodies are known to be excreted.
Dutasteride Pregnancy And Lactation Text: This medication is absolutely contraindicated in women, especially during pregnancy and breast feeding. Feminization of male fetuses is possible if taking while pregnant.
Hyrimoz Counseling:  I discussed with the patient the risks of adalimumab including but not limited to myelosuppression, immunosuppression, autoimmune hepatitis, demyelinating diseases, lymphoma, and serious infections.  The patient understands that monitoring is required including a PPD at baseline and must alert us or the primary physician if symptoms of infection or other concerning signs are noted.
Otezla Counseling: The side effects of Otezla were discussed with the patient, including but not limited to worsening or new depression, weight loss, diarrhea, nausea, upper respiratory tract infection, and headache. Patient instructed to call the office should any adverse effect occur.  The patient verbalized understanding of the proper use and possible adverse effects of Otezla.  All the patient's questions and concerns were addressed.
Protopic Pregnancy And Lactation Text: This medication is Pregnancy Category C. It is unknown if this medication is excreted in breast milk when applied topically.
Prednisone Counseling:  I discussed with the patient the risks of prolonged use of prednisone including but not limited to weight gain, insomnia, osteoporosis, mood changes, diabetes, susceptibility to infection, glaucoma and high blood pressure.  In cases where prednisone use is prolonged, patients should be monitored with blood pressure checks, serum glucose levels and an eye exam.  Additionally, the patient may need to be placed on GI prophylaxis, PCP prophylaxis, and calcium and vitamin D supplementation and/or a bisphosphonate.  The patient verbalized understanding of the proper use and the possible adverse effects of prednisone.  All of the patient's questions and concerns were addressed.
Low Dose Naltrexone Pregnancy And Lactation Text: Naltrexone is pregnancy category C.  There have been no adequate and well-controlled studies in pregnant women.  It should be used in pregnancy only if the potential benefit justifies the potential risk to the fetus.   Limited data indicates that naltrexone is minimally excreted into breastmilk.
Griseofulvin Counseling:  I discussed with the patient the risks of griseofulvin including but not limited to photosensitivity, cytopenia, liver damage, nausea/vomiting and severe allergy.  The patient understands that this medication is best absorbed when taken with a fatty meal (e.g., ice cream or french fries).
Topical Clindamycin Counseling: Patient counseled that this medication may cause skin irritation or allergic reactions.  In the event of skin irritation, the patient was advised to reduce the amount of the drug applied or use it less frequently.   The patient verbalized understanding of the proper use and possible adverse effects of clindamycin.  All of the patient's questions and concerns were addressed.
Detail Level: Zone
Bexarotene Pregnancy And Lactation Text: This medication is Pregnancy Category X and should not be given to women who are pregnant or may become pregnant. This medication should not be used if you are breast feeding.
Bactrim Counseling:  I discussed with the patient the risks of sulfa antibiotics including but not limited to GI upset, allergic reaction, drug rash, diarrhea, dizziness, photosensitivity, and yeast infections.  Rarely, more serious reactions can occur including but not limited to aplastic anemia, agranulocytosis, methemoglobinemia, blood dyscrasias, liver or kidney failure, lung infiltrates or desquamative/blistering drug rashes.
Siliq Counseling:  I discussed with the patient the risks of Siliq including but not limited to new or worsening depression, suicidal thoughts and behavior, immunosuppression, malignancy, posterior leukoencephalopathy syndrome, and serious infections.  The patient understands that monitoring is required including a PPD at baseline and must alert us or the primary physician if symptoms of infection or other concerning signs are noted. There is also a special program designed to monitor depression which is required with Siliq.
Zyclara Counseling:  I discussed with the patient the risks of imiquimod including but not limited to erythema, scaling, itching, weeping, crusting, and pain.  Patient understands that the inflammatory response to imiquimod is variable from person to person and was educated regarded proper titration schedule.  If flu-like symptoms develop, patient knows to discontinue the medication and contact us.
Tranexamic Acid Counseling:  Patient advised of the small risk of bleeding problems with tranexamic acid. They were also instructed to call if they developed any nausea, vomiting or diarrhea. All of the patient's questions and concerns were addressed.
Rifampin Counseling: I discussed with the patient the risks of rifampin including but not limited to liver damage, kidney damage, red-orange body fluids, nausea/vomiting and severe allergy.
Rinvoq Pregnancy And Lactation Text: Based on animal studies, Rinvoq may cause embryo-fetal harm when administered to pregnant women.  The medication should not be used in pregnancy.  Breastfeeding is not recommended during treatment and for 6 days after the last dose.
Minoxidil Counseling: Minoxidil is a topical medication which can increase blood flow where it is applied. It is uncertain how this medication increases hair growth. Side effects are uncommon and include stinging and allergic reactions.
Colchicine Counseling:  Patient counseled regarding adverse effects including but not limited to stomach upset (nausea, vomiting, stomach pain, or diarrhea).  Patient instructed to limit alcohol consumption while taking this medication.  Colchicine may reduce blood counts especially with prolonged use.  The patient understands that monitoring of kidney function and blood counts may be required, especially at baseline. The patient verbalized understanding of the proper use and possible adverse effects of colchicine.  All of the patient's questions and concerns were addressed.
Otezla Pregnancy And Lactation Text: This medication is Pregnancy Category C and it isn't known if it is safe during pregnancy. It is unknown if it is excreted in breast milk.
Benzoyl Peroxide Counseling: Patient counseled that medicine may cause skin irritation and bleach clothing.  In the event of skin irritation, the patient was advised to reduce the amount of the drug applied or use it less frequently.   The patient verbalized understanding of the proper use and possible adverse effects of benzoyl peroxide.  All of the patient's questions and concerns were addressed.
Finasteride Male Counseling: Finasteride Counseling:  I discussed with the patient the risks of use of finasteride including but not limited to decreased libido, decreased ejaculate volume, gynecomastia, and depression. Women should not handle medication.  All of the patient's questions and concerns were addressed.
Dupixent Counseling: I discussed with the patient the risks of dupilumab including but not limited to eye infection and irritation, cold sores, injection site reactions, worsening of asthma, allergic reactions and increased risk of parasitic infection.  Live vaccines should be avoided while taking dupilumab. Dupilumab will also interact with certain medications such as warfarin and cyclosporine. The patient understands that monitoring is required and they must alert us or the primary physician if symptoms of infection or other concerning signs are noted.
Bactrim Pregnancy And Lactation Text: This medication is Pregnancy Category D and is known to cause fetal risk.  It is also excreted in breast milk.
Cellcept Counseling:  I discussed with the patient the risks of mycophenolate mofetil including but not limited to infection/immunosuppression, GI upset, hypokalemia, hypercholesterolemia, bone marrow suppression, lymphoproliferative disorders, malignancy, GI ulceration/bleed/perforation, colitis, interstitial lung disease, kidney failure, progressive multifocal leukoencephalopathy, and birth defects.  The patient understands that monitoring is required including a baseline creatinine and regular CBC testing. In addition, patient must alert us immediately if symptoms of infection or other concerning signs are noted.
Rhofade Counseling: Rhofade is a topical medication which can decrease superficial blood flow where applied. Side effects are uncommon and include stinging, redness and allergic reactions.
Isotretinoin Counseling: Patient should get monthly blood tests, not donate blood, not drive at night if vision affected, not share medication, and not undergo elective surgery for 6 months after tx completed. Side effects reviewed, pt to contact office should one occur.
Griseofulvin Pregnancy And Lactation Text: This medication is Pregnancy Category X and is known to cause serious birth defects. It is unknown if this medication is excreted in breast milk but breast feeding should be avoided.
Libtayo Counseling- I discussed with the patient the risks of Libtayo including but not limited to nausea, vomiting, diarrhea, and bone or muscle pain.  The patient verbalized understanding of the proper use and possible adverse effects of Libtayo.  All of the patient's questions and concerns were addressed.
Niacinamide Counseling: I recommended taking niacin or niacinamide, also know as vitamin B3, twice daily. Recent evidence suggests that taking vitamin B3 (500 mg twice daily) can reduce the risk of actinic keratoses and non-melanoma skin cancers. Side effects of vitamin B3 include flushing and headache.

## 2025-04-22 NOTE — PROCEDURE: ORDER TESTS
Performing Laboratory: 0
Billing Type: Third-Party Bill
Bill For Surgical Tray: no
Expected Date Of Service: 04/22/2025

## 2025-04-22 NOTE — PROCEDURE: ADDITIONAL NOTES
Render Risk Assessment In Note?: no
Detail Level: Detailed
Additional Notes: Recommended to watch photo sensitivity, discussed to keep covered.  Pt has D/C otezla completely without flares.

## 2025-05-02 DIAGNOSIS — F11.20 UNCOMPLICATED OPIOID DEPENDENCE (HCC): ICD-10-CM

## 2025-05-02 NOTE — TELEPHONE ENCOUNTER
Received request via: Patient    Was the patient seen in the last year in this department? Yes    Does the patient have an active prescription (recently filled or refills available) for medication(s) requested? No    Pharmacy Name: [facility]     Does the patient have shelter Plus and need 100-day supply? (This applies to ALL medications) Patient does not have SCP

## 2025-05-05 DIAGNOSIS — F11.20 UNCOMPLICATED OPIOID DEPENDENCE (HCC): ICD-10-CM

## 2025-05-05 RX ORDER — BUPRENORPHINE AND NALOXONE 8; 2 MG/1; MG/1
FILM, SOLUBLE BUCCAL; SUBLINGUAL
Qty: 60 EACH | Refills: 0 | Status: SHIPPED | OUTPATIENT
Start: 2025-05-05 | End: 2025-05-29 | Stop reason: SDUPTHER

## 2025-05-05 RX ORDER — BUPRENORPHINE AND NALOXONE 8; 2 MG/1; MG/1
FILM, SOLUBLE BUCCAL; SUBLINGUAL
Qty: 60 EACH | Refills: 0 | Status: CANCELLED | OUTPATIENT
Start: 2025-05-05 | End: 2025-06-01

## 2025-05-05 NOTE — TELEPHONE ENCOUNTER
Pharmacy requesting day supply.     Received request via: Pharmacy    Was the patient seen in the last year in this department? Yes    Does the patient have an active prescription (recently filled or refills available) for medication(s) requested? No    [facility]     Does the patient have [facility] Plus and need 100-day supply? (This applies to ALL medications) Patient does not have SCP

## 2025-05-29 DIAGNOSIS — F11.20 UNCOMPLICATED OPIOID DEPENDENCE (HCC): ICD-10-CM

## 2025-05-29 NOTE — TELEPHONE ENCOUNTER
Received request via: Patient    Was the patient seen in the last year in this department? Yes    Does the patient have an active prescription (recently filled or refills available) for medication(s) requested? No    Pharmacy Name: [facility]     Does the patient have [facility] Plus and need 100-day supply? (This applies to ALL medications) Patient does not have SCP

## 2025-05-30 RX ORDER — BUPRENORPHINE AND NALOXONE 8; 2 MG/1; MG/1
FILM, SOLUBLE BUCCAL; SUBLINGUAL
Qty: 60 EACH | Refills: 0 | Status: SHIPPED | OUTPATIENT
Start: 2025-05-30 | End: 2025-06-25

## 2025-06-24 DIAGNOSIS — F11.20 UNCOMPLICATED OPIOID DEPENDENCE (HCC): ICD-10-CM

## 2025-06-24 RX ORDER — BUPRENORPHINE AND NALOXONE 8; 2 MG/1; MG/1
FILM, SOLUBLE BUCCAL; SUBLINGUAL
Qty: 60 EACH | Refills: 0 | Status: SHIPPED | OUTPATIENT
Start: 2025-06-24 | End: 2025-07-20

## 2025-06-24 NOTE — TELEPHONE ENCOUNTER
Received request via: Patient    Was the patient seen in the last year in this department? Yes    Does the patient have an active prescription (recently filled or refills available) for medication(s) requested? No    Pharmacy Name: [facility]     Does the patient have retirement Plus and need 100-day supply? (This applies to ALL medications) Patient does not have SCP

## 2025-06-30 DIAGNOSIS — F11.20 UNCOMPLICATED OPIOID DEPENDENCE (HCC): ICD-10-CM

## 2025-06-30 NOTE — TELEPHONE ENCOUNTER
PHARMACY REQUESTING ANOTHER SCRIPT SENT     Received request via: Patient    Was the patient seen in the last year in this department? Yes    Does the patient have an active prescription (recently filled or refills available) for medication(s) requested? No    Pharmacy Name: [facility]    Does the patient have [facility] and need 100-day supply? (This applies to ALL medications) Patient does not have SCP

## 2025-07-01 RX ORDER — BUPRENORPHINE AND NALOXONE 8; 2 MG/1; MG/1
FILM, SOLUBLE BUCCAL; SUBLINGUAL
Qty: 60 EACH | Refills: 0 | Status: SHIPPED | OUTPATIENT
Start: 2025-07-01 | End: 2025-07-22 | Stop reason: SDUPTHER

## 2025-07-22 DIAGNOSIS — F11.20 UNCOMPLICATED OPIOID DEPENDENCE (HCC): ICD-10-CM

## 2025-07-22 NOTE — TELEPHONE ENCOUNTER
Received request via: Patient    Was the patient seen in the last year in this department? Yes    Does the patient have an active prescription (recently filled or refills available) for medication(s) requested? No    Pharmacy Name: [facility]     Does the patient have senior living Plus and need 100-day supply? (This applies to ALL medications) Patient does not have SCP

## 2025-07-23 RX ORDER — BUPRENORPHINE AND NALOXONE 8; 2 MG/1; MG/1
FILM, SOLUBLE BUCCAL; SUBLINGUAL
Qty: 60 EACH | Refills: 0 | Status: SHIPPED | OUTPATIENT
Start: 2025-07-23 | End: 2025-08-16

## 2025-08-07 ENCOUNTER — APPOINTMENT (OUTPATIENT)
Dept: BEHAVIORAL HEALTH | Facility: MEDICAL CENTER | Age: 45
End: 2025-08-07
Attending: FAMILY MEDICINE
Payer: COMMERCIAL

## 2025-08-25 DIAGNOSIS — F11.20 UNCOMPLICATED OPIOID DEPENDENCE (HCC): ICD-10-CM

## 2025-08-25 RX ORDER — BUPRENORPHINE AND NALOXONE 8; 2 MG/1; MG/1
FILM, SOLUBLE BUCCAL; SUBLINGUAL
Qty: 60 EACH | Refills: 0 | Status: SHIPPED | OUTPATIENT
Start: 2025-08-25 | End: 2025-08-28 | Stop reason: SDUPTHER

## 2025-08-28 ENCOUNTER — HOSPITAL ENCOUNTER (OUTPATIENT)
Dept: BEHAVIORAL HEALTH | Facility: MEDICAL CENTER | Age: 45
End: 2025-08-28
Attending: FAMILY MEDICINE
Payer: COMMERCIAL

## 2025-08-28 VITALS — DIASTOLIC BLOOD PRESSURE: 66 MMHG | SYSTOLIC BLOOD PRESSURE: 136 MMHG | RESPIRATION RATE: 93 BRPM | HEART RATE: 82 BPM

## 2025-08-28 DIAGNOSIS — F11.20 UNCOMPLICATED OPIOID DEPENDENCE (HCC): Primary | ICD-10-CM

## 2025-08-28 PROCEDURE — 99212 OFFICE O/P EST SF 10 MIN: CPT

## 2025-08-28 PROCEDURE — 99214 OFFICE O/P EST MOD 30 MIN: CPT | Performed by: FAMILY MEDICINE

## 2025-08-28 RX ORDER — BUPRENORPHINE AND NALOXONE 8; 2 MG/1; MG/1
FILM, SOLUBLE BUCCAL; SUBLINGUAL
Qty: 60 EACH | Refills: 0 | Status: SHIPPED | OUTPATIENT
Start: 2025-08-28 | End: 2025-09-21

## 2025-08-28 ASSESSMENT — ENCOUNTER SYMPTOMS
MYALGIAS: 0
TINGLING: 0
RESPIRATORY NEGATIVE: 1
BRUISES/BLEEDS EASILY: 0
NEUROLOGICAL NEGATIVE: 1
GASTROINTESTINAL NEGATIVE: 1
FEVER: 0
NAUSEA: 0
EYES NEGATIVE: 1
CONSTITUTIONAL NEGATIVE: 1
COUGH: 0
MUSCULOSKELETAL NEGATIVE: 1
CHILLS: 0
DEPRESSION: 0
HEARTBURN: 0
BLURRED VISION: 0
PSYCHIATRIC NEGATIVE: 1
DIZZINESS: 0
DOUBLE VISION: 0
PALPITATIONS: 0
CARDIOVASCULAR NEGATIVE: 1
HEMOPTYSIS: 0
HEADACHES: 0